# Patient Record
Sex: MALE | Race: WHITE | NOT HISPANIC OR LATINO
[De-identification: names, ages, dates, MRNs, and addresses within clinical notes are randomized per-mention and may not be internally consistent; named-entity substitution may affect disease eponyms.]

---

## 2021-05-26 PROBLEM — Z00.00 ENCOUNTER FOR PREVENTIVE HEALTH EXAMINATION: Status: ACTIVE | Noted: 2021-05-26

## 2021-05-27 ENCOUNTER — NON-APPOINTMENT (OUTPATIENT)
Age: 61
End: 2021-05-27

## 2021-05-27 ENCOUNTER — APPOINTMENT (OUTPATIENT)
Dept: ORTHOPEDIC SURGERY | Facility: CLINIC | Age: 61
End: 2021-05-27
Payer: COMMERCIAL

## 2021-05-27 VITALS — HEIGHT: 68 IN | WEIGHT: 200 LBS | BODY MASS INDEX: 30.31 KG/M2

## 2021-05-27 VITALS — HEART RATE: 67 BPM | SYSTOLIC BLOOD PRESSURE: 163 MMHG | DIASTOLIC BLOOD PRESSURE: 80 MMHG

## 2021-05-27 PROCEDURE — 99072 ADDL SUPL MATRL&STAF TM PHE: CPT

## 2021-05-27 PROCEDURE — 99204 OFFICE O/P NEW MOD 45 MIN: CPT

## 2021-05-27 PROCEDURE — 73502 X-RAY EXAM HIP UNI 2-3 VIEWS: CPT | Mod: LT

## 2021-05-27 PROCEDURE — 72100 X-RAY EXAM L-S SPINE 2/3 VWS: CPT

## 2021-05-27 NOTE — CONSULT LETTER
[Dear  ___] : Dear  [unfilled], [Consult Letter:] : I had the pleasure of evaluating your patient, [unfilled]. [Please see my note below.] : Please see my note below. [Consult Closing:] : Thank you very much for allowing me to participate in the care of this patient.  If you have any questions, please do not hesitate to contact me. [Sincerely,] : Sincerely, [FreeTextEntry2] : PADMINI ONEAL  [FreeTextEntry3] : Liang Tolbert M.D.

## 2021-05-27 NOTE — ADDENDUM
[FreeTextEntry1] : I, Patrice White, acted solely as a scribe for Dr. Liang Tolbert on this date 05/27/2021.\par All medical record entries made by the Scribe were at my, Dr. Liang Tolbert, direction and personally dictated by me on 05/27/2021. I have reviewed the chart and agree that the record accurately reflects my personal performance of the history, physical exam, assessment and plan. I have also personally directed, reviewed, and agreed with the chart.

## 2021-05-27 NOTE — DISCUSSION/SUMMARY
[de-identified] : I discussed the underlying pathophysiology of the patient's condition in great detail with the patient. I went over the patient's x-rays with them in great detail. The extent of the patient’s arthritis was discussed in great detail with them. We discussed the use of ice, Tylenol and anti-inflammatories to relieve pain. A prescription for Diclofenac was provided. At-home strengthening, and stretching exercises were discussed and demonstrated with the patient. We went over the wide ranging benefits of exercise for the patient health and I encouraged him to begin a diligent exercise program. He should focus on light weight and high repetition exercises. He needs to avoid high-impact activities such as running and jumping. I recommend alternate activities such as riding a stationary bike on low tension, or the elliptical. He should avoid exercises that involve arching and twisting. I stressed the importance of continued weight loss and its benefits to the patient’s joints and overall health. At this time, he will start a course of physical therapy for strengthening and flexibility. A prescription for physical therapy was provided. All of his questions were answered. He understands and consents to the plan.\par \par FU 1 month\par after undergoing PT for his low back and hips.\par after taking Diclofenac.\par after focusing on continued weight loss.

## 2021-05-27 NOTE — PHYSICAL EXAM
[de-identified] : Constitutional\par o Appearance : well-nourished, well developed, alert, in no acute distress \par Head and Face\par o Head :\par ¦ Inspection : atraumatic, normocephalic\par o Face :\par ¦ Inspection : no visible rash or discoloration\par Respiratory\par o Respiratory Effort: breathing unlabored \par Neurologic\par o Mental Status Examination :\par ¦ Orientation : alert and oriented X 3\par Psychiatric\par o Mood and Affect: mood normal, affect appropriate\par Cardiovascular\par o Observation/Palpation : - no swelling\par Lymphatic\par o Additional Nodes : No palpable lymph nodes present\par \par Lumbosacral Spine\par o Inspection : no visible rash or discoloration\par o Palpation : paraspinal musculature nontender\par o Range of Motion : extension and sidebending causes minimal anterior left thigh pain, rotation causes anterior thigh pain\par o Muscle Strength : paraspinal muscle strength and tone within normal limits\par o Muscle Tone : paraspinal muscle strength and tone within normal limits\par o Tests: straight leg test negative\par  \par Right Lower Extremity\par o Buttock : no tenderness, swelling or deformities\par o Right Hip :\par ¦ Inspection/Palpation : no tenderness, swelling or deformities\par ¦ Range of Motion : full flexion and external rotation, limited internal rotation with discomfort, no crepitance\par ¦ Stability : joint stability intact\par ¦ Strength : extension, flexion, adduction, abduction, internal rotation and external rotation, 4-/5 \par \par Left Lower Extremity\par o Buttock : no tenderness, swelling or deformities \par o Left Hip :\par ¦ Inspection/Palpation : no tenderness, swelling or deformities\par ¦ Range of Motion : full flexion and limited rotation with pain, no crepitance\par ¦ Stability : joint stability intact\par ¦ Strength : extension, flexion, adduction, abduction, internal rotation and external rotation, 4-/5\par  \par Gait: short stride, slightly kyphotic, a slight abductor lurch to the left, no significant extremity swelling or lymphedema, equal leg lengths, no foot drop, excellent core strength, normal sensation to light touch in both lower extremities\par \par Radiology Results \par o Lumbosacral Spine : AP and lateral views were obtained and reveal diffuse degenerative disc disease worse at L3/L4 with foraminal stenosis at L4/L5 and L5/S1.\par o Pelvis: AP pelvis and lateral left hip were obtained and reveal severe degenerative arthritis of both hips.

## 2021-05-27 NOTE — HISTORY OF PRESENT ILLNESS
[de-identified] : 61 year old male presents complaining of bilateral groin and thigh pain, left side worse than right. His pain started mildly 1 year ago and worsened about 5 months ago. He denies injury. He notes that walking and standing for a long period of time can be most uncomfortable. He is usually able to walk about 1/2 a mile before he has to rest. He also complains of pain when sitting in certain chairs for too long. He has some pain when sleeping at night. He has mild intermittent low back pain, as well. He does not experience pain that radiates past his knees. He notes difficulty lifting his leg to put on shoes and socks. He is a  and sits a lot at work. He is fully vaccinated and recently started going to the gym regularly. He notes that his weight has been stable. His BMI is 30.4 (weight= 200 pounds, height = 5 foot 8 inches).

## 2021-05-29 ENCOUNTER — TRANSCRIPTION ENCOUNTER (OUTPATIENT)
Age: 61
End: 2021-05-29

## 2021-07-08 ENCOUNTER — APPOINTMENT (OUTPATIENT)
Dept: ORTHOPEDIC SURGERY | Facility: CLINIC | Age: 61
End: 2021-07-08
Payer: COMMERCIAL

## 2021-07-08 PROCEDURE — 99072 ADDL SUPL MATRL&STAF TM PHE: CPT

## 2021-07-08 PROCEDURE — 99214 OFFICE O/P EST MOD 30 MIN: CPT

## 2021-07-08 NOTE — ADDENDUM
[FreeTextEntry1] : I, Patrice White, acted solely as a scribe for Dr. Liang Tolbert on this date 07/08/2021.\par All medical record entries made by the Scribe were at my, Dr. Liang Tolbert, direction and personally dictated by me on 07/08/2021. I have reviewed the chart and agree that the record accurately reflects my personal performance of the history, physical exam, assessment and plan. I have also personally directed, reviewed, and agreed with the chart.

## 2021-07-08 NOTE — PHYSICAL EXAM
[de-identified] : Constitutional\par o Appearance : well-nourished, well developed, alert, in no acute distress \par Head and Face\par o Head :\par ¦ Inspection : atraumatic, normocephalic\par o Face :\par ¦ Inspection : no visible rash or discoloration\par Respiratory\par o Respiratory Effort: breathing unlabored \par Neurologic\par o Mental Status Examination :\par ¦ Orientation : alert and oriented X 3\par Psychiatric\par o Mood and Affect: mood normal, affect appropriate\par Cardiovascular\par o Observation/Palpation : - no swelling\par Lymphatic\par o Additional Nodes : No palpable lymph nodes present\par \par Lumbosacral Spine\par o Inspection : no visible rash or discoloration\par o Palpation : paraspinal musculature nontender\par o Range of Motion : arc of motion full and painless in all planes\par o Muscle Strength : paraspinal muscle strength and tone within normal limits\par o Muscle Tone : paraspinal muscle strength and tone within normal limits\par o Tests: straight leg test negative and GHISLAINE test negative bilaterally\par  \par Right Lower Extremity\par o Buttock : no tenderness, swelling or deformities\par o Right Hip :\par ¦ Inspection/Palpation : no tenderness, swelling or deformities\par ¦ Range of Motion : full flexion, slight limitation of rotation with minimal discomfort, no crepitance\par ¦ Stability : joint stability intact\par ¦ Strength : extension, flexion, adduction, abduction, internal rotation and external rotation, 5/5 \par \par Left Lower Extremity\par o Buttock : no tenderness, swelling or deformities \par o Left Hip :\par ¦ Inspection/Palpation : no tenderness, swelling or deformities\par ¦ Range of Motion : full flexion, slight limitation of rotation with minimal discomfort, no crepitance\par ¦ Stability : joint stability intact\par ¦ Strength : extension, flexion, adduction, abduction, internal rotation and external rotation, 5/5\par  \par Gait: normal smooth gait, heel and toe walking without discomfort, no significant extremity swelling or lymphedema, right leg is 1/4 inch longer than the left, no foot drop, excellent core strength, normal sensation to light touch in both lower extremities

## 2021-07-08 NOTE — HISTORY OF PRESENT ILLNESS
[de-identified] : 61 year old male presents complaining of bilateral groin and thigh pain, left side worse than right. He has mild intermittent low back pain, as well. He has been going to PT and is feeling much better. He is also taking Diclofenac every morning. He notes he is now able to walk distances without a problem. He notes mild trouble putting on socks and shoes due to stiffness, but not pain. He denies any buckling or giving out. Of note, He is fully vaccinated against COVID-19 and has started going to the gym regularly. He notes his weight has been stable. His BMI is 30.4 (weight= 200 pounds, height = 5 foot 8 inches).\par \par Radiology Results taken on 5/27/21:\par o Lumbosacral Spine : AP and lateral views were obtained and revealed diffuse degenerative disc disease worse at L3/L4 with foraminal stenosis at L4/L5 and L5/S1.\par o Pelvis: AP pelvis and lateral left hip were obtained and revealed severe degenerative arthritis of both hips.

## 2021-07-08 NOTE — DISCUSSION/SUMMARY
[de-identified] : I went over the pathophysiology of the patient's symptoms in great detail with the patient. We discussed the use of ice, Tylenol and anti-inflammatories to relieve pain. I advised him to try to wean himself off of the Diclofenac to 2-3 times a week. At-home strengthening, and stretching exercises were discussed and demonstrated with the patient. We went over the wide ranging benefits of exercise for the patient's health and I encouraged him to begin a diligent exercise program. I recommended activities such as bike riding, swimming, and light weight lifting. He should focus on light weight and high repetition exercises. All of his questions were answered. He understands and consents to the plan.\par \par FU 3 months.\par after following a diligent HEP.

## 2021-09-23 ENCOUNTER — RX RENEWAL (OUTPATIENT)
Age: 61
End: 2021-09-23

## 2021-10-14 ENCOUNTER — APPOINTMENT (OUTPATIENT)
Dept: ORTHOPEDIC SURGERY | Facility: CLINIC | Age: 61
End: 2021-10-14
Payer: COMMERCIAL

## 2021-10-14 PROCEDURE — 99214 OFFICE O/P EST MOD 30 MIN: CPT

## 2021-10-14 NOTE — DISCUSSION/SUMMARY
[de-identified] : I went over the pathophysiology of the patient's symptoms in great detail with the patient. We discussed the use of ice, Tylenol and anti-inflammatories to relieve pain. I advised him not to mix the Diclofenac and Aleve. Instead of Aleve he should try taking Tylenol. He will continue exercising on his own. I advised him to alternate using an elliptical and a stationary bike at the gym. All of his questions were answered. He understands and consents to the plan.\par \par FU 3 months.

## 2021-10-14 NOTE — ADDENDUM
[FreeTextEntry1] : I, Patrice White, acted solely as a scribe for Dr. Liang Tolbert on this date 10/14/2021.\par All medical record entries made by the Scribe were at my, Dr. Liang Tolbert, direction and personally dictated by me on 10/14/2021. I have reviewed the chart and agree that the record accurately reflects my personal performance of the history, physical exam, assessment and plan. I have also personally directed, reviewed, and agreed with the chart.

## 2021-10-14 NOTE — HISTORY OF PRESENT ILLNESS
[de-identified] : 61 year old male presents complaining of bilateral groin and thigh pain, left side worse than right. He denies low back pain. He finished a course of PT from June to July and was doing better. He has been exercising at the gym 3-4 times a week. He does an elliptical not the treadmill. He notes that he feels great after he works out but the next day he has discomfort. He notes that his pain is a little worse. It is a 2/10 on average but can be a 5-6/10. He takes Diclofenac in the morning and Aleve at night. Of note, He is fully vaccinated against COVID-19. He notes his weight has been stable. His BMI is 30.4 (weight= 200 pounds, height = 5 foot 8 inches).\par \par Radiology Results taken on 5/27/21:\par o Lumbosacral Spine : AP and lateral views were obtained and revealed diffuse degenerative disc disease worse at L3/L4 with foraminal stenosis at L4/L5 and L5/S1.\par o Pelvis: AP pelvis and lateral left hip were obtained and revealed severe degenerative arthritis of both hips.

## 2022-01-19 ENCOUNTER — RX RENEWAL (OUTPATIENT)
Age: 62
End: 2022-01-19

## 2022-01-19 RX ORDER — DICLOFENAC SODIUM 75 MG/1
75 TABLET, DELAYED RELEASE ORAL
Qty: 60 | Refills: 3 | Status: ACTIVE | COMMUNITY
Start: 2021-05-27 | End: 1900-01-01

## 2022-01-20 ENCOUNTER — APPOINTMENT (OUTPATIENT)
Dept: ORTHOPEDIC SURGERY | Facility: CLINIC | Age: 62
End: 2022-01-20
Payer: COMMERCIAL

## 2022-01-20 DIAGNOSIS — M47.816 SPONDYLOSIS W/OUT MYELOPATHY OR RADICULOPATHY, LUMBAR REGION: ICD-10-CM

## 2022-01-20 PROCEDURE — 99214 OFFICE O/P EST MOD 30 MIN: CPT

## 2022-01-20 NOTE — ADDENDUM
[FreeTextEntry1] : I, Patrice White, acted solely as a scribe for Dr. Liang Tolbert on this date 01/20/2022.\par All medical record entries made by the Scribe were at my, Dr. Liang Tolbert, direction and personally dictated by me on 01/20/2022. I have reviewed the chart and agree that the record accurately reflects my personal performance of the history, physical exam, assessment and plan. I have also personally directed, reviewed, and agreed with the chart.

## 2022-01-20 NOTE — PHYSICAL EXAM
[de-identified] : Constitutional\par o Appearance : well-nourished, well developed, alert, in no acute distress \par Head and Face\par o Head :\par ¦ Inspection : atraumatic, normocephalic\par o Face :\par ¦ Inspection : no visible rash or discoloration\par Respiratory\par o Respiratory Effort: breathing unlabored \par Neurologic\par o Mental Status Examination :\par ¦ Orientation : alert and oriented X 3\par Psychiatric\par o Mood and Affect: mood normal, affect appropriate\par Cardiovascular\par o Observation/Palpation : - no swelling\par Lymphatic\par o Additional Nodes : No palpable lymph nodes present\par \par Lumbosacral Spine\par o Inspection : no visible rash or discoloration\par o Palpation : paraspinal musculature nontender\par o Range of Motion : arc of motion full and painless in all planes\par o Muscle Strength : paraspinal muscle strength and tone within normal limits\par o Muscle Tone : paraspinal muscle strength and tone within normal limits\par o Tests: straight leg test negative and GHISLAINE test negative bilaterally\par  \par Right Lower Extremity\par o Buttock : no tenderness, swelling or deformities\par o Right Hip :\par ¦ Inspection/Palpation : slight greater trochanteric tenderness, no swelling or deformities\par ¦ Range of Motion : full flexion, limited rotation with discomfort, mild pain with rotation, no crepitance\par ¦ Stability : joint stability intact\par ¦ Strength : extension, flexion 3+/5, adduction, abduction 4/5, internal rotation and external rotation\par \par Left Lower Extremity\par o Buttock : no tenderness, swelling or deformities \par o Left Hip :\par ¦ Inspection/Palpation : mild greater trochanteric tenderness, no swelling or deformities\par ¦ Range of Motion : full flexion, limited rotation, no crepitance\par ¦ Stability : joint stability intact\par ¦ Strength : extension, flexion 4-/5, adduction, abduction 4+/5, internal rotation and external rotation\par  \par Gait: slight abductor lurch on the right, no significant extremity swelling or lymphedema, equal leg lengths, no foot drop, excellent core strength, normal sensation to light touch

## 2022-01-20 NOTE — DISCUSSION/SUMMARY
[de-identified] : I went over the pathophysiology of the patient's symptoms in great detail with the patient. At this time, he will start a course of physical therapy for strengthening and flexibility.  We have preliminarily discussed total hip replacement.  We will try this course of physical therapy to evaluate where the strength will decrease his symptoms.  He will try to use diclofenac pills on a more as necessary basis instead of around-the-clock.  A prescription was provided. All of his questions were answered. He understands and consents to the plan.\par \par FU 1 month.\par after undergoing PT for his hips.

## 2022-01-20 NOTE — HISTORY OF PRESENT ILLNESS
[de-identified] : 61 year old male presents complaining of bilateral lateral hip pain, left worse than right. His back is not bothering him. He finished a course of PT from June-July and was doing better. He is going to the gym regularly. He was last seen on 10/14/21 and notes his hips are doing worse since then. He notes exacerbated right hip pain over the holidays after he did a lot of standing. He worked from home and his pain improved. He then [place] work in the city, which causes pain by the end of the day. He denies groin pain. He notes trouble tying the left shoe. He notes a 0-5/10 pain on the right side, and a 4-6/10 pain on the left side. He is still taking Diclofenac daily. \par Of note, He is fully vaccinated against COVID-19. He notes his weight has been stable. His BMI is 30.4 (weight= 200 pounds, height = 5 foot 8 inches).\par \par Radiology Results taken on 5/27/21:\par o Lumbosacral Spine : AP and lateral views were obtained and revealed diffuse degenerative disc disease worse at L3/L4 with foraminal stenosis at L4/L5 and L5/S1.\par o Pelvis: AP pelvis and lateral left hip were obtained and revealed severe degenerative arthritis of both hips.

## 2022-03-10 ENCOUNTER — APPOINTMENT (OUTPATIENT)
Dept: ORTHOPEDIC SURGERY | Facility: CLINIC | Age: 62
End: 2022-03-10
Payer: COMMERCIAL

## 2022-03-10 VITALS — BODY MASS INDEX: 29.86 KG/M2 | WEIGHT: 197 LBS | HEIGHT: 68 IN

## 2022-03-10 DIAGNOSIS — M48.062 SPINAL STENOSIS, LUMBAR REGION WITH NEUROGENIC CLAUDICATION: ICD-10-CM

## 2022-03-10 PROCEDURE — 99214 OFFICE O/P EST MOD 30 MIN: CPT

## 2022-03-10 NOTE — ADDENDUM
[FreeTextEntry1] : I, Patrice White, acted solely as a scribe for Dr. Liang Tolbert on this date 03/10/2022.\par All medical record entries made by the Scribe were at my, Dr. Liang Tolbert, direction and personally dictated by me on 03/10/2022. I have reviewed the chart and agree that the record accurately reflects my personal performance of the history, physical exam, assessment and plan. I have also personally directed, reviewed, and agreed with the chart.

## 2022-03-10 NOTE — PHYSICAL EXAM
[de-identified] : Constitutional\par o Appearance : well-nourished, well developed, alert, in no acute distress \par Head and Face\par o Head :\par ¦ Inspection : atraumatic, normocephalic\par o Face :\par ¦ Inspection : no visible rash or discoloration\par Respiratory\par o Respiratory Effort: breathing unlabored \par Neurologic\par o Mental Status Examination :\par ¦ Orientation : alert and oriented X 3\par Psychiatric\par o Mood and Affect: mood normal, affect appropriate\par Cardiovascular\par o Observation/Palpation : - no swelling\par Lymphatic\par o Additional Nodes : No palpable lymph nodes present\par \par Lumbosacral Spine\par o Inspection : no visible rash or discoloration\par o Palpation : paraspinal musculature nontender\par o Range of Motion : arc of motion full and painless in all planes\par o Muscle Strength : paraspinal muscle strength and tone within normal limits\par o Muscle Tone : paraspinal muscle strength and tone within normal limits\par o Tests: straight leg test negative and GHISLAINE test negative bilaterally\par  \par Right Lower Extremity\par o Buttock : no tenderness, swelling or deformities\par o Right Hip :\par ¦ Inspection/Palpation : slight greater trochanteric tenderness, no swelling or deformities\par ¦ Range of Motion : full flexion, limited internal rotation with discomfort, no crepitance\par ¦ Stability : joint stability intact\par ¦ Strength : extension, flexion 5/5, adduction, abduction, internal rotation and external rotation\par \par Left Lower Extremity\par o Buttock : no tenderness, swelling or deformities \par o Left Hip :\par ¦ Inspection/Palpation : mild greater trochanteric tenderness, no swelling or deformities\par ¦ Range of Motion : full flexion but goes into obligatory external rotation, essentially no internal rotation, no crepitance\par ¦ Stability : joint stability intact\par ¦ Strength : extension, flexion 5/5, adduction, abduction, internal rotation and external rotation\par  \par Gait: slight abductor lurch on the right, no significant extremity swelling or lymphedema, equal leg lengths, no foot drop, excellent core strength, normal sensation to light touch

## 2022-03-10 NOTE — DISCUSSION/SUMMARY
[de-identified] : I went over the pathophysiology of the patient's symptoms in great detail with the patient. I informed the patient that surgery will be required to provide them long term relief from their symptoms. I am recommending a left total hip replacement and advised him this is the best his hips will get and at 62 years old it is not too early for surgery. Patient understands he needs to consider hip replacement given he is losing motion and conservative measures are not providing enough relief. I informed him that I no longer perform hip replacements. He was provided with the names of my partners Dr. Theo Kwan and Dr. Timoteo Laboy and I recommend he sees one of them for a consultation. All of his questions were answered. He understands and consents to the plan.\par \par FU with Dr. Kwan or Dr. Laboy to be evaluated for a left total hip replacement

## 2022-03-10 NOTE — HISTORY OF PRESENT ILLNESS
[de-identified] : 62 year old male presents complaining of bilateral lateral hip pain. The left is more consistent but less severe. The right is more severe when it flares up. He recently finished 10 PT sessions. He feels a little stronger and can now go up and down stairs. He doesn't feel PT helped much with his pain. He describes left anterior hip pain, and right groin and lateral hip pain. He feels he has lost some ROM. He notes trouble tying the left shoe. \par Of note: He is fully vaccinated against COVID-19. He notes he lost 3 pounds. His BMI is 30.4 (weight= 200 pounds, height = 5 foot 8 inches).\par \par Radiology Results taken on 5/27/21:\par o Lumbosacral Spine : AP and lateral views were obtained and revealed diffuse degenerative disc disease worse at L3/L4 with foraminal stenosis at L4/L5 and L5/S1.\par o Pelvis: AP pelvis and lateral left hip were obtained and revealed severe degenerative arthritis of both hips.

## 2022-03-16 ENCOUNTER — APPOINTMENT (OUTPATIENT)
Dept: ORTHOPEDIC SURGERY | Facility: CLINIC | Age: 62
End: 2022-03-16
Payer: COMMERCIAL

## 2022-03-16 VITALS — BODY MASS INDEX: 29.86 KG/M2 | HEIGHT: 68 IN | WEIGHT: 197 LBS

## 2022-03-16 DIAGNOSIS — M16.11 UNILATERAL PRIMARY OSTEOARTHRITIS, RIGHT HIP: ICD-10-CM

## 2022-03-16 PROCEDURE — 99214 OFFICE O/P EST MOD 30 MIN: CPT

## 2022-03-16 NOTE — DISCUSSION/SUMMARY
[de-identified] : Impression end-stage osteoarthritis right greater than left hip\par Recommendation given compromised quality life unresponsive to medical management to date I have recommended total hip replacement and reviewed risk benefits and alternatives to anterior total hip have also recommended radiation for prophylaxis of heterotopic ossification

## 2022-03-16 NOTE — HISTORY OF PRESENT ILLNESS
[de-identified] : 62-year-old male  who 1 year ago developed spontaneousonset of bilateral hip pain.  Pain left hip is tonic character constant over the anterior aspect of the thigh and into the groin.  Right hip is episodic over the lateral aspect occasion the groin.  Symptoms are not improved in response to physical therapy.  Left until recently symptoms were responsive to diclofenac.  Overall patient feels he has "more bad days than good days.  Feels that quality life is compromised

## 2022-03-16 NOTE — PHYSICAL EXAM
[de-identified] : Constitutional:Well nourished , well developed and in no acute distress\par Psychiatric: Alert and oriented to time place and person.Appropriate affect \par Skin:Head, neck, arms and lower extremities:no lesions or discoloration\par HEENT: Normocephalic, EOM intact, Nasal septum midline,\par Respiratory: Unlabored respirations,no audible wheezing ,no tachypnea, no cyanosis\par Cardiovascular: no leg swelling  no ankle edema no JVD, pulse regular\par Vascular: no calf or thigh tenderness, \par Peripheral pulses; intact\par Lymphatics:No groin adenopathy,no lymphedema lower  or upper extremities\par Right and left Leg lengths equal passive motion restricted and painful left flexion 80 internal 0 external 30 abduction 30 adduction 20\par Right flexion 80 internal -5 external 30 abduction 30 adduction 15 [de-identified] : Recent x-rays AP pelvis right and left hip are reviewed and demonstrate bilateral hip degenerative arthritis joint space narrowing superolateral bone-on-bone contact subchondral sclerosis

## 2022-04-06 ENCOUNTER — OUTPATIENT (OUTPATIENT)
Dept: OUTPATIENT SERVICES | Facility: HOSPITAL | Age: 62
LOS: 1 days | End: 2022-04-06
Payer: COMMERCIAL

## 2022-04-06 VITALS
DIASTOLIC BLOOD PRESSURE: 75 MMHG | RESPIRATION RATE: 14 BRPM | TEMPERATURE: 97 F | SYSTOLIC BLOOD PRESSURE: 157 MMHG | HEART RATE: 68 BPM | HEIGHT: 67 IN | WEIGHT: 199.96 LBS | OXYGEN SATURATION: 98 %

## 2022-04-06 DIAGNOSIS — M16.11 UNILATERAL PRIMARY OSTEOARTHRITIS, RIGHT HIP: ICD-10-CM

## 2022-04-06 DIAGNOSIS — Z01.818 ENCOUNTER FOR OTHER PREPROCEDURAL EXAMINATION: ICD-10-CM

## 2022-04-06 DIAGNOSIS — K08.409 PARTIAL LOSS OF TEETH, UNSPECIFIED CAUSE, UNSPECIFIED CLASS: Chronic | ICD-10-CM

## 2022-04-06 LAB
A1C WITH ESTIMATED AVERAGE GLUCOSE RESULT: 5.9 % — HIGH (ref 4–5.6)
ALBUMIN SERPL ELPH-MCNC: 4.2 G/DL — SIGNIFICANT CHANGE UP (ref 3.3–5)
ALP SERPL-CCNC: 58 U/L — SIGNIFICANT CHANGE UP (ref 30–120)
ALT FLD-CCNC: 65 U/L DA — HIGH (ref 10–60)
ANION GAP SERPL CALC-SCNC: 2 MMOL/L — LOW (ref 5–17)
APTT BLD: 31.7 SEC — SIGNIFICANT CHANGE UP (ref 27.5–35.5)
AST SERPL-CCNC: 26 U/L — SIGNIFICANT CHANGE UP (ref 10–40)
BILIRUB SERPL-MCNC: 0.7 MG/DL — SIGNIFICANT CHANGE UP (ref 0.2–1.2)
BLD GP AB SCN SERPL QL: SIGNIFICANT CHANGE UP
BUN SERPL-MCNC: 21 MG/DL — SIGNIFICANT CHANGE UP (ref 7–23)
CALCIUM SERPL-MCNC: 8.9 MG/DL — SIGNIFICANT CHANGE UP (ref 8.4–10.5)
CHLORIDE SERPL-SCNC: 102 MMOL/L — SIGNIFICANT CHANGE UP (ref 96–108)
CO2 SERPL-SCNC: 31 MMOL/L — SIGNIFICANT CHANGE UP (ref 22–31)
CREAT SERPL-MCNC: 0.85 MG/DL — SIGNIFICANT CHANGE UP (ref 0.5–1.3)
EGFR: 97 ML/MIN/1.73M2 — SIGNIFICANT CHANGE UP
ESTIMATED AVERAGE GLUCOSE: 123 MG/DL — HIGH (ref 68–114)
GLUCOSE SERPL-MCNC: 108 MG/DL — HIGH (ref 70–99)
HCT VFR BLD CALC: 47.2 % — SIGNIFICANT CHANGE UP (ref 39–50)
HGB BLD-MCNC: 15.2 G/DL — SIGNIFICANT CHANGE UP (ref 13–17)
INR BLD: 0.97 RATIO — SIGNIFICANT CHANGE UP (ref 0.88–1.16)
MCHC RBC-ENTMCNC: 28.1 PG — SIGNIFICANT CHANGE UP (ref 27–34)
MCHC RBC-ENTMCNC: 32.2 GM/DL — SIGNIFICANT CHANGE UP (ref 32–36)
MCV RBC AUTO: 87.2 FL — SIGNIFICANT CHANGE UP (ref 80–100)
MRSA PCR RESULT.: SIGNIFICANT CHANGE UP
NRBC # BLD: 0 /100 WBCS — SIGNIFICANT CHANGE UP (ref 0–0)
PLATELET # BLD AUTO: 169 K/UL — SIGNIFICANT CHANGE UP (ref 150–400)
POTASSIUM SERPL-MCNC: 5.4 MMOL/L — HIGH (ref 3.5–5.3)
POTASSIUM SERPL-SCNC: 5.4 MMOL/L — HIGH (ref 3.5–5.3)
PROT SERPL-MCNC: 7.9 G/DL — SIGNIFICANT CHANGE UP (ref 6–8.3)
PROTHROM AB SERPL-ACNC: 11.5 SEC — SIGNIFICANT CHANGE UP (ref 10.5–13.4)
RBC # BLD: 5.41 M/UL — SIGNIFICANT CHANGE UP (ref 4.2–5.8)
RBC # FLD: 12.7 % — SIGNIFICANT CHANGE UP (ref 10.3–14.5)
S AUREUS DNA NOSE QL NAA+PROBE: DETECTED
SODIUM SERPL-SCNC: 135 MMOL/L — SIGNIFICANT CHANGE UP (ref 135–145)
WBC # BLD: 8.76 K/UL — SIGNIFICANT CHANGE UP (ref 3.8–10.5)
WBC # FLD AUTO: 8.76 K/UL — SIGNIFICANT CHANGE UP (ref 3.8–10.5)

## 2022-04-06 PROCEDURE — 87640 STAPH A DNA AMP PROBE: CPT

## 2022-04-06 PROCEDURE — 87641 MR-STAPH DNA AMP PROBE: CPT

## 2022-04-06 PROCEDURE — 93010 ELECTROCARDIOGRAM REPORT: CPT

## 2022-04-06 PROCEDURE — 80053 COMPREHEN METABOLIC PANEL: CPT

## 2022-04-06 PROCEDURE — 93005 ELECTROCARDIOGRAM TRACING: CPT

## 2022-04-06 PROCEDURE — 85027 COMPLETE CBC AUTOMATED: CPT

## 2022-04-06 PROCEDURE — 36415 COLL VENOUS BLD VENIPUNCTURE: CPT

## 2022-04-06 PROCEDURE — 85730 THROMBOPLASTIN TIME PARTIAL: CPT

## 2022-04-06 PROCEDURE — 83036 HEMOGLOBIN GLYCOSYLATED A1C: CPT

## 2022-04-06 PROCEDURE — 85610 PROTHROMBIN TIME: CPT

## 2022-04-06 PROCEDURE — 86900 BLOOD TYPING SEROLOGIC ABO: CPT

## 2022-04-06 PROCEDURE — 86901 BLOOD TYPING SEROLOGIC RH(D): CPT

## 2022-04-06 PROCEDURE — G0463: CPT

## 2022-04-06 PROCEDURE — 86850 RBC ANTIBODY SCREEN: CPT

## 2022-04-06 NOTE — H&P PST ADULT - MUSCULOSKELETAL
detailed exam bilateral hips/no joint swelling/no joint erythema/no joint warmth/no calf tenderness/decreased ROM/decreased ROM due to pain/diminished strength details…

## 2022-04-06 NOTE — H&P PST ADULT - NSICDXPASTMEDICALHX_GEN_ALL_CORE_FT
PAST MEDICAL HISTORY:  Borderline hypertension     COVID-19 vaccine series completed     Dry eye of left side     Ear stuffiness, left     Hyperlipidemia     Lumbar spinal stenosis     Osteoarthritis

## 2022-04-06 NOTE — H&P PST ADULT - FALL HARM RISK - UNIVERSAL INTERVENTIONS
Bed in lowest position, wheels locked, appropriate side rails in place/Call bell, personal items and telephone in reach/Instruct patient to call for assistance before getting out of bed or chair/[employer] to call system/Physically safe environment - no spills, clutter or unnecessary equipment/Purposeful Proactive Rounding/Room/bathroom lighting operational, light cord in reach

## 2022-04-06 NOTE — H&P PST ADULT - HISTORY OF PRESENT ILLNESS
61 yo male presents with c/o bilateral hip and groin pain. Left hip has been painful for a longer period of time but the right hip is more painful. Pain in the right hip and groin started in October 2021. He was treated initially with PT with improved strength but no change in pain. Pain currently 3/10 at rest and increased to 6-81/0 with activity. Pain is mildly relieved with diclofenac and tylenol.

## 2022-04-06 NOTE — H&P PST ADULT - NSANTHOSAYNRD_GEN_A_CORE
No. NICK screening performed.  STOP BANG Legend: 0-2 = LOW Risk; 3-4 = INTERMEDIATE Risk; 5-8 = HIGH Risk neck 16.5 inches/No. NICK screening performed.  STOP BANG Legend: 0-2 = LOW Risk; 3-4 = INTERMEDIATE Risk; 5-8 = HIGH Risk

## 2022-04-06 NOTE — H&P PST ADULT - PROBLEM SELECTOR PLAN 1
right anterior THR. medical clearance requested. instructed to stop vitamin E, diclofenac, arthrosoothe, omega-avail and amino acid synergy 1 week prior to surgery. surgical wash instructions reviewed and verbalized understanding. [employer] PCR appt. confirmed for 4/24/22.

## 2022-04-06 NOTE — H&P PST ADULT - NSICDXFAMILYHX_GEN_ALL_CORE_FT
FAMILY HISTORY:  Father  Still living? No  Family history of DVT, Age at diagnosis: Age Unknown  Family history of kidney disease, Age at diagnosis: Age Unknown  Family history of stomach cancer, Age at diagnosis: Age Unknown    Mother  Still living? No  Family history of osteoarthritis, Age at diagnosis: Age Unknown  FHx: dementia, Age at diagnosis: Age Unknown    Sibling  Still living? Yes, Estimated age: 71-80  Family history of hyperlipidemia, Age at diagnosis: Age Unknown  Family history of osteoarthritis, Age at diagnosis: Age Unknown    Grandparent  Still living? No  Family history of type 2 diabetes mellitus, Age at diagnosis: Age Unknown  Family history of type 2 diabetes mellitus, Age at diagnosis: Age Unknown

## 2022-04-07 RX ORDER — MUPIROCIN 20 MG/G
1 OINTMENT TOPICAL
Qty: 1 | Refills: 0
Start: 2022-04-07 | End: 2022-04-11

## 2022-04-07 NOTE — PROGRESS NOTE ADULT - SUBJECTIVE AND OBJECTIVE BOX
Nasal PCR results: staph aureus detected  Topical mupirocin escribed  Spoke to patient; understands treatment as prescribed

## 2022-04-13 PROBLEM — M48.061 SPINAL STENOSIS, LUMBAR REGION WITHOUT NEUROGENIC CLAUDICATION: Chronic | Status: ACTIVE | Noted: 2022-04-06

## 2022-04-13 PROBLEM — H93.8X2 OTHER SPECIFIED DISORDERS OF LEFT EAR: Chronic | Status: ACTIVE | Noted: 2022-04-06

## 2022-04-13 PROBLEM — Z92.29 PERSONAL HISTORY OF OTHER DRUG THERAPY: Chronic | Status: ACTIVE | Noted: 2022-04-06

## 2022-04-13 PROBLEM — R03.0 ELEVATED BLOOD-PRESSURE READING, WITHOUT DIAGNOSIS OF HYPERTENSION: Chronic | Status: ACTIVE | Noted: 2022-04-06

## 2022-04-13 PROBLEM — E78.5 HYPERLIPIDEMIA, UNSPECIFIED: Chronic | Status: ACTIVE | Noted: 2022-04-06

## 2022-04-13 PROBLEM — M19.90 UNSPECIFIED OSTEOARTHRITIS, UNSPECIFIED SITE: Chronic | Status: ACTIVE | Noted: 2022-04-06

## 2022-04-13 PROBLEM — H04.122 DRY EYE SYNDROME OF LEFT LACRIMAL GLAND: Chronic | Status: ACTIVE | Noted: 2022-04-06

## 2022-04-20 ENCOUNTER — APPOINTMENT (OUTPATIENT)
Dept: ORTHOPEDIC SURGERY | Facility: CLINIC | Age: 62
End: 2022-04-20
Payer: COMMERCIAL

## 2022-04-20 VITALS — HEIGHT: 68 IN | BODY MASS INDEX: 29.55 KG/M2 | WEIGHT: 195 LBS

## 2022-04-20 PROCEDURE — 99213 OFFICE O/P EST LOW 20 MIN: CPT

## 2022-04-20 NOTE — DISCUSSION/SUMMARY
[de-identified] : Impression end-stage osteoarthritis right greater than left hip\par Recommendation given compromised quality life unresponsive to medical management to date I have recommended total hip replacement and reviewed risk benefits and alternatives to anterior total hip have also recommended radiation for prophylaxis of heterotopic ossification

## 2022-04-20 NOTE — HISTORY OF PRESENT ILLNESS
[de-identified] : Follow-upbilateral hip pain.  Pain left hip is tonic character constant over the anterior aspect of the thigh and into the groin.  Right hip is episodic over the lateral aspect occasion the groin.  Symptoms are not improved in response to physical therapy.  Left until recently symptoms were responsive to diclofenac.  Overall patient feels he has "more bad days than good days.  Feels that quality life is compromised

## 2022-04-20 NOTE — PHYSICAL EXAM
[de-identified] : Constitutional:Well nourished , well developed and in no acute distress\par Psychiatric: Alert and oriented to time place and person.Appropriate affect \par Skin:Head, neck, arms and lower extremities:no lesions or discoloration\par HEENT: Normocephalic, EOM intact, Nasal septum midline,\par Respiratory: Unlabored respirations,no audible wheezing ,no tachypnea, no cyanosis\par Cardiovascular: no leg swelling  no ankle edema no JVD, pulse regular\par Vascular: no calf or thigh tenderness, \par Peripheral pulses; intact\par Lymphatics:No groin adenopathy,no lymphedema lower  or upper extremities\par Right and left Leg lengths equal passive motion restricted and painful left flexion 80 internal 0 external 30 abduction 30 adduction 20\par Right flexion 80 internal -5 external 30 abduction 30 adduction 15 Skin intact [de-identified] : Review x-rays AP pelvis right and left hip are reviewed and demonstrate bilateral hip degenerative arthritis joint space narrowing superolateral bone-on-bone contact subchondral sclerosis

## 2022-04-21 ENCOUNTER — OUTPATIENT (OUTPATIENT)
Dept: OUTPATIENT SERVICES | Facility: HOSPITAL | Age: 62
LOS: 1 days | Discharge: HOME | End: 2022-04-21
Payer: COMMERCIAL

## 2022-04-21 ENCOUNTER — APPOINTMENT (OUTPATIENT)
Dept: RADIATION ONCOLOGY | Facility: CLINIC | Age: 62
End: 2022-04-21
Payer: COMMERCIAL

## 2022-04-21 DIAGNOSIS — K08.409 PARTIAL LOSS OF TEETH, UNSPECIFIED CAUSE, UNSPECIFIED CLASS: Chronic | ICD-10-CM

## 2022-04-21 DIAGNOSIS — M16.0 BILATERAL PRIMARY OSTEOARTHRITIS OF HIP: ICD-10-CM

## 2022-04-21 PROCEDURE — 99202 OFFICE O/P NEW SF 15 MIN: CPT | Mod: 25,95

## 2022-04-21 PROCEDURE — 77261 THER RADIOLOGY TX PLNG SMPL: CPT

## 2022-04-21 RX ORDER — ROSUVASTATIN CALCIUM 20 MG/1
20 TABLET, FILM COATED ORAL
Refills: 0 | Status: ACTIVE | COMMUNITY
Start: 2022-04-21

## 2022-04-24 ENCOUNTER — OUTPATIENT (OUTPATIENT)
Dept: OUTPATIENT SERVICES | Facility: HOSPITAL | Age: 62
LOS: 1 days | End: 2022-04-24
Payer: COMMERCIAL

## 2022-04-24 DIAGNOSIS — Z20.828 CONTACT WITH AND (SUSPECTED) EXPOSURE TO OTHER VIRAL COMMUNICABLE DISEASES: ICD-10-CM

## 2022-04-24 DIAGNOSIS — K08.409 PARTIAL LOSS OF TEETH, UNSPECIFIED CAUSE, UNSPECIFIED CLASS: Chronic | ICD-10-CM

## 2022-04-24 LAB — SARS-COV-2 RNA SPEC QL NAA+PROBE: SIGNIFICANT CHANGE UP

## 2022-04-24 PROCEDURE — U0003: CPT

## 2022-04-24 PROCEDURE — U0005: CPT

## 2022-04-24 RX ORDER — ACETAMINOPHEN 500 MG
1000 TABLET ORAL EVERY 8 HOURS
Refills: 0 | Status: DISCONTINUED | OUTPATIENT
Start: 2022-04-27 | End: 2022-04-27

## 2022-04-24 RX ORDER — PANTOPRAZOLE SODIUM 20 MG/1
40 TABLET, DELAYED RELEASE ORAL
Refills: 0 | Status: DISCONTINUED | OUTPATIENT
Start: 2022-04-26 | End: 2022-04-27

## 2022-04-24 RX ORDER — MAGNESIUM HYDROXIDE 400 MG/1
30 TABLET, CHEWABLE ORAL DAILY
Refills: 0 | Status: DISCONTINUED | OUTPATIENT
Start: 2022-04-26 | End: 2022-04-27

## 2022-04-24 RX ORDER — CELECOXIB 200 MG/1
200 CAPSULE ORAL EVERY 12 HOURS
Refills: 0 | Status: DISCONTINUED | OUTPATIENT
Start: 2022-04-27 | End: 2022-04-27

## 2022-04-24 RX ORDER — HYDROMORPHONE HYDROCHLORIDE 2 MG/ML
0.5 INJECTION INTRAMUSCULAR; INTRAVENOUS; SUBCUTANEOUS
Refills: 0 | Status: DISCONTINUED | OUTPATIENT
Start: 2022-04-26 | End: 2022-04-27

## 2022-04-24 RX ORDER — POLYETHYLENE GLYCOL 3350 17 G/17G
17 POWDER, FOR SOLUTION ORAL AT BEDTIME
Refills: 0 | Status: DISCONTINUED | OUTPATIENT
Start: 2022-04-26 | End: 2022-04-27

## 2022-04-24 RX ORDER — OXYCODONE HYDROCHLORIDE 5 MG/1
5 TABLET ORAL
Refills: 0 | Status: DISCONTINUED | OUTPATIENT
Start: 2022-04-26 | End: 2022-04-27

## 2022-04-24 RX ORDER — SODIUM CHLORIDE 9 MG/ML
1000 INJECTION, SOLUTION INTRAVENOUS
Refills: 0 | Status: DISCONTINUED | OUTPATIENT
Start: 2022-04-26 | End: 2022-04-27

## 2022-04-24 RX ORDER — ONDANSETRON 8 MG/1
4 TABLET, FILM COATED ORAL EVERY 6 HOURS
Refills: 0 | Status: DISCONTINUED | OUTPATIENT
Start: 2022-04-26 | End: 2022-04-27

## 2022-04-24 RX ORDER — OXYCODONE HYDROCHLORIDE 5 MG/1
10 TABLET ORAL
Refills: 0 | Status: DISCONTINUED | OUTPATIENT
Start: 2022-04-26 | End: 2022-04-27

## 2022-04-24 RX ORDER — SENNA PLUS 8.6 MG/1
2 TABLET ORAL AT BEDTIME
Refills: 0 | Status: DISCONTINUED | OUTPATIENT
Start: 2022-04-26 | End: 2022-04-27

## 2022-04-25 PROCEDURE — 77280 THER RAD SIMULAJ FIELD SMPL: CPT | Mod: 26

## 2022-04-25 PROCEDURE — 77431 RADIATION THERAPY MANAGEMENT: CPT

## 2022-04-25 PROCEDURE — 77300 RADIATION THERAPY DOSE PLAN: CPT | Mod: 26

## 2022-04-25 NOTE — PATIENT PROFILE ADULT - FALL HARM RISK - UNIVERSAL INTERVENTIONS
Bed in lowest position, wheels locked, appropriate side rails in place/Call bell, personal items and telephone in reach/Instruct patient to call for assistance before getting out of bed or chair/Non-slip footwear when patient is out of bed/[place] to call system/Physically safe environment - no spills, clutter or unnecessary equipment/Purposeful Proactive Rounding/Room/bathroom lighting operational, light cord in reach

## 2022-04-25 NOTE — PATIENT PROFILE ADULT - DOMESTIC TRAVEL HIGH RISK QUESTION
[facility]   AMBULATORY ENCOUNTER  FAMILY MEDICINE OFFICE VISIT    CHIEF COMPLAINT:  Chief Complaint   Patient presents with   • Follow-up     follow up        Walter Kendall is accompanied by his wife. Permission obtained from patient to discuss his health and health history in front of the accompanying party.    SUBJECTIVE:  Walter Kendall is a 45 year old male who presented today for 6 month follow-up of his chronic conditions including asthma, thyroid disease, erectile dysfunction, vitamin-D deficiency.  Patient reports compliance in taking his medications without side effects specifically no chest pain, palpitations, cough, nausea, vomiting, diarrhea.  Patient has the following concerns today:    Erectile dysfunction:  Patient and his wife report that patient use to have good sexual activity.  It seems like it suddenly decreased.  He is had the Viagra 100 mg which seems to work for him however there is some concern between them that the medication is covering up something else.  Patient does note that his dad had low testosterone.  They would like to have this tested today.     Psoriasis:  Patient and his wife commented that he has skin rash that is significantly worsening on his arms, legs, abdomen, and scalp.  Patient reports that is at times itchy but at other times does not seem to affect a much.    Left ear:  Patient reports approximately year and half ago he had a significant left ear infection.  He does note increased my fullness and dulled voices.  He denies pain, drainage, warmth, fevers.     REVIEW OF SYSTEMS:    All other systems are reviewed and are negative except as documented in the history of present illness.       OBJECTIVE:  PROBLEM LIST:    Patient Active Problem List   Diagnosis   • Asthma   • Chemical burn of respiratory tract   • Thyroid disease   • Erectile dysfunction due to diseases classified elsewhere   • Vitamin D deficiency   • Psoriasis       PAST HISTORIES:  I have  reviewed the patient's social history, substance use, medications, allergies, immunizations, medical history, surgical history, and family history listed in the medical record as obtained by the clinical staff and agree with their documentation.    PHYSICAL EXAM:    Vitals:    01/14/20 1245   BP: 112/68   Pulse: 68   Resp: 16   Temp: 97 °F (36.1 °C)   TempSrc: Temporal   SpO2: 97%   Weight: 97.6 kg   Height: 5' 7\" (1.702 m)      Pulse Ox Interpretation: Within normal limits.  Constitutional: Well developed, well nourished, appears stated age, alert and oriented, cooperative, in no apparent distress.  Head: Normocephalic, without obvious abnormality, atraumatic.  Eyes: Sclerae white, conjunctivae are normal, corneas clear, lids and lashes are normal, visual acuity is grossly normal; bilaterally.  Ears: Pinna and external ear is normal bilaterally, right external auditory canal is clear and normal, tympanic membrane is pearly gray with sharp cone of light, middle ear space is normal.  Left external auditory ear canal is cerumen impacted.  Unable to visualize tympanic membrane.  There is no discomfort on traction of the pinna or palpation of the tragus and auditory acuity is grossly normal; bilaterally.  Nose/Sinus: External nose is normal to inspection, Septum midline, nasal passages are normal, No sinus tenderness.  Mouth: The soft palate is symmetrical without lesion. The oral mucosa is moist and intact without thrush and gingiva are normal. The tongue is midline and appears normal. The uvula is midline without edema or erythema.   Throat: The posterior pharynx is normalwithout lesion.  No tonsillar hypertrophy, erythema or exudate.  Neck: Supple, symmetrical, trachea midline. No cervical or supraclavicular lymphadenopathy.   Respiratory: Clear to auscultation with normal inspiratory/expiratory sounds, no wheezes, no rales, no rhonchi, non labored respirations, no visible use of accessory muscles.  Cardiovascular:  Normal PMI, regular rate and rhythm, S1 and S2 normal, no S3 or S4, no murmurs, splits, clicks, rubs, or gallops.   Abdominal: Round, soft, nontender, normoactive bowel sounds in all four quadrants, without masses, no hepatosplenomegaly, without pulsatile masses.   Musculoskeletal: No clubbing, no cyanosis, no edema, bilaterally. No erythema, no deformity, no tenderness upon palpation, no evidence of joint effusion, normal muscle tone (5+/5+)with full range of motion, no evidence of joint instability or crepitation.   Neurological:  normal sensation and deep tendon reflexes 2+ bilaterally. No focal deficits or lateralizing signs.   Integumentary:  Several silver scale lesions with erythematous base on the right lower extremity, left lower extremity, right upper extremity and dorsal surface of the hand, he lesions noted on the lower right abdomen.  The scalp has numerous small macular erythematous lesions.  Normal color for ethnicity, vascularity normal, no evidence of bleeding or bruising, no atypical-appearing skin lesions, no rashes, no edema, temperature normal, normal texture, good turgor, nails normal without clubbing.  Otherwise skin is Normal color for ethnicity, no evidence of bleeding or bruising, no edema.   Psychiatric: Appropriate mood and affect.  Normal thought processes, speech, memory, attention span/concentration, judgment, insight, reliability.    ASSESSMENT:    This is a 45 year old male with  1. Chronic disease /disorder    2. Mild intermittent asthma without complication    3. Thyroid disease    4. Erectile dysfunction, unspecified erectile dysfunction type    5. Vitamin D deficiency    6. Elevated cholesterol    7. Impacted cerumen of left ear    8. Psoriasis        PLAN:  Chronic disease /disorder    Mild intermittent asthma without complication  - albuterol 108 (90 Base) MCG/ACT inhaler; Inhale 2 puffs into the lungs as needed for Shortness of Breath or Wheezing.  Dispense: 1 Inhaler; Refill:  3  - CBC WITH DIFFERENTIAL; Future    Thyroid disease  Continue levothyroxine 125 mcg daily.  - COMPREHENSIVE METABOLIC PANEL; Future  - THYROID STIMULATING HORMONE REFLEX; Future    Erectile dysfunction, unspecified erectile dysfunction type  Continue sildenafil 100 mg as needed.  - TESTOSTERONE TOTAL-MALE; Future    Vitamin D deficiency  Vitamin-D 05166 units weekly.  - VITAMIN D -25 HYDROXY; Future    Elevated cholesterol  Consume low saturated fat diet.  Increase cardiovascular exercise.  - LIPID PANEL WITH REFLEX; Future    Impacted cerumen of left ear  Ear lavage today see documentation.    Psoriasis  Ketoconazole 1% shampoo 2 to 3 times a week.  Consider topical clobetasol 0.05% or other high potency steroid cream for psoriatic rash.  Consider referral to rheumatology.    Return in about 6 months (around 7/14/2020) for Medication Follow Up with labs, labs today.  .  Six-month labs to include CMP, FLP, vitamin-D, TSH.    HEALTH CARE MAINTENANCE:    Health Maintenance Summary     Pneumococcal Vaccine 0-64 (1 of 1 - PPSV23)  Overdue since 4/4/1978    DTaP/Tdap/Td Vaccine (1 - Tdap)  Overdue since 4/4/1991    Influenza Vaccine (1)  Overdue since 9/1/2019    Depression Screening (Yearly)  Due soon on 7/11/2020    Meningococcal Vaccine   Aged Out        Healthcare maintenance as obtained by the clinical staff was reviewed and agree with their documentation. Topics discussed with patient and updated, appropriate orders completed if agreed upon by patient. Health Maintenance recommendation(s) discussed with the patient:  Patient declines immunizations for tetanus, pneumococcal, influenza..     Patient instructions provided as documented in the After Visit Summary.  All the above plan and medication(s) side effect profile was discussed with the patient in detail, questions were answered to the patient's/caregiver's satisfaction. The patient/caregiver verbalized understanding and was agreeable to the above plan.  Patient/caregiver was instructed to follow up with any questions or concerns. Patient left the office in stable condition with verbal and written instructions.    Patient/caregiver was encouraged to consider use of [facility] for review of health information, for communication regarding health concerns and questions and to easily schedule appointments and make payments.      Patient/caregiver would like communication of their results via:      Cell Phone:   Telephone Information:   Mobile [phone]     Okay to leave a message containing results? Yes.     Time spent with Walter Kendall = 35 minutes.  More than half the time was spent in counseling about the listed medical problems and coordination of care.  Discussion today included importance of compliance with treatment, risk factor reduction, patient education, and instructions for management, treatment and follow-up care.   No

## 2022-04-26 ENCOUNTER — RESULT REVIEW (OUTPATIENT)
Age: 62
End: 2022-04-26

## 2022-04-26 ENCOUNTER — TRANSCRIPTION ENCOUNTER (OUTPATIENT)
Age: 62
End: 2022-04-26

## 2022-04-26 ENCOUNTER — INPATIENT (INPATIENT)
Facility: HOSPITAL | Age: 62
LOS: 0 days | Discharge: HOME | DRG: 470 | End: 2022-04-27
Attending: ORTHOPAEDIC SURGERY | Admitting: ORTHOPAEDIC SURGERY
Payer: COMMERCIAL

## 2022-04-26 ENCOUNTER — APPOINTMENT (OUTPATIENT)
Dept: ORTHOPEDIC SURGERY | Facility: HOSPITAL | Age: 62
End: 2022-04-26

## 2022-04-26 VITALS
SYSTOLIC BLOOD PRESSURE: 145 MMHG | TEMPERATURE: 98 F | DIASTOLIC BLOOD PRESSURE: 68 MMHG | RESPIRATION RATE: 20 BRPM | HEART RATE: 87 BPM | OXYGEN SATURATION: 98 % | WEIGHT: 191.58 LBS | HEIGHT: 67 IN

## 2022-04-26 DIAGNOSIS — M16.11 UNILATERAL PRIMARY OSTEOARTHRITIS, RIGHT HIP: ICD-10-CM

## 2022-04-26 DIAGNOSIS — K08.409 PARTIAL LOSS OF TEETH, UNSPECIFIED CAUSE, UNSPECIFIED CLASS: Chronic | ICD-10-CM

## 2022-04-26 LAB
ANION GAP SERPL CALC-SCNC: 9 MMOL/L — SIGNIFICANT CHANGE UP (ref 5–17)
BUN SERPL-MCNC: 12 MG/DL — SIGNIFICANT CHANGE UP (ref 7–23)
CALCIUM SERPL-MCNC: 8.6 MG/DL — SIGNIFICANT CHANGE UP (ref 8.4–10.5)
CHLORIDE SERPL-SCNC: 106 MMOL/L — SIGNIFICANT CHANGE UP (ref 96–108)
CO2 SERPL-SCNC: 26 MMOL/L — SIGNIFICANT CHANGE UP (ref 22–31)
CREAT SERPL-MCNC: 0.81 MG/DL — SIGNIFICANT CHANGE UP (ref 0.5–1.3)
EGFR: 98 ML/MIN/1.73M2 — SIGNIFICANT CHANGE UP
GLUCOSE SERPL-MCNC: 186 MG/DL — HIGH (ref 70–99)
HCT VFR BLD CALC: 37.3 % — LOW (ref 39–50)
HGB BLD-MCNC: 12.1 G/DL — LOW (ref 13–17)
MCHC RBC-ENTMCNC: 28.3 PG — SIGNIFICANT CHANGE UP (ref 27–34)
MCHC RBC-ENTMCNC: 32.4 GM/DL — SIGNIFICANT CHANGE UP (ref 32–36)
MCV RBC AUTO: 87.4 FL — SIGNIFICANT CHANGE UP (ref 80–100)
NRBC # BLD: 0 /100 WBCS — SIGNIFICANT CHANGE UP (ref 0–0)
PLATELET # BLD AUTO: 151 K/UL — SIGNIFICANT CHANGE UP (ref 150–400)
POTASSIUM SERPL-MCNC: 4.7 MMOL/L — SIGNIFICANT CHANGE UP (ref 3.5–5.3)
POTASSIUM SERPL-SCNC: 4.7 MMOL/L — SIGNIFICANT CHANGE UP (ref 3.5–5.3)
RBC # BLD: 4.27 M/UL — SIGNIFICANT CHANGE UP (ref 4.2–5.8)
RBC # FLD: 12.6 % — SIGNIFICANT CHANGE UP (ref 10.3–14.5)
SODIUM SERPL-SCNC: 141 MMOL/L — SIGNIFICANT CHANGE UP (ref 135–145)
WBC # BLD: 11.77 K/UL — HIGH (ref 3.8–10.5)
WBC # FLD AUTO: 11.77 K/UL — HIGH (ref 3.8–10.5)

## 2022-04-26 PROCEDURE — 99222 1ST HOSP IP/OBS MODERATE 55: CPT

## 2022-04-26 PROCEDURE — 88311 DECALCIFY TISSUE: CPT | Mod: 26

## 2022-04-26 PROCEDURE — 88305 TISSUE EXAM BY PATHOLOGIST: CPT | Mod: 26

## 2022-04-26 PROCEDURE — 27130 TOTAL HIP ARTHROPLASTY: CPT | Mod: RT

## 2022-04-26 DEVICE — CUP ACET ALTEON CLUS HOLE POROUS COAT GRP 6 54MM: Type: IMPLANTABLE DEVICE | Site: RIGHT | Status: FUNCTIONAL

## 2022-04-26 DEVICE — HEAD FEM BIOLOX DELTA 36MM PLUS 3.5MM: Type: IMPLANTABLE DEVICE | Site: RIGHT | Status: FUNCTIONAL

## 2022-04-26 DEVICE — LIGATING CLIPS TELEFLEX HORIZON MD/LG: Type: IMPLANTABLE DEVICE | Site: RIGHT | Status: FUNCTIONAL

## 2022-04-26 DEVICE — IMPLANTABLE DEVICE: Type: IMPLANTABLE DEVICE | Site: RIGHT | Status: FUNCTIONAL

## 2022-04-26 DEVICE — BONE WAX 2.5GM: Type: IMPLANTABLE DEVICE | Site: RIGHT | Status: FUNCTIONAL

## 2022-04-26 DEVICE — SCREW BONE ALTEON 6.5X20MM: Type: IMPLANTABLE DEVICE | Site: RIGHT | Status: FUNCTIONAL

## 2022-04-26 DEVICE — HEMOCLIP MED BLUE 6 CARTRIDGE TITANIUM: Type: IMPLANTABLE DEVICE | Site: RIGHT | Status: FUNCTIONAL

## 2022-04-26 DEVICE — ROD REAMING W/BALL TIP 2.5X650MM STRL: Type: IMPLANTABLE DEVICE | Site: RIGHT | Status: FUNCTIONAL

## 2022-04-26 DEVICE — COIL NIT OCCLD PDA 6X5MM: Type: IMPLANTABLE DEVICE | Site: RIGHT | Status: FUNCTIONAL

## 2022-04-26 DEVICE — LINER ACET ALTEON XLE NEUT GRP 6 36MM: Type: IMPLANTABLE DEVICE | Site: RIGHT | Status: FUNCTIONAL

## 2022-04-26 DEVICE — BLLN PTA ANGIOSCULPT 4X40: Type: IMPLANTABLE DEVICE | Site: RIGHT | Status: FUNCTIONAL

## 2022-04-26 DEVICE — HEMOCLIP LG ORANGE 6 CARTRIDGE TITANIUM: Type: IMPLANTABLE DEVICE | Site: RIGHT | Status: FUNCTIONAL

## 2022-04-26 RX ORDER — CHLORHEXIDINE GLUCONATE 213 G/1000ML
1 SOLUTION TOPICAL ONCE
Refills: 0 | Status: COMPLETED | OUTPATIENT
Start: 2022-04-26 | End: 2022-04-26

## 2022-04-26 RX ORDER — TRANEXAMIC ACID 100 MG/ML
1000 INJECTION, SOLUTION INTRAVENOUS ONCE
Refills: 0 | Status: COMPLETED | OUTPATIENT
Start: 2022-04-26 | End: 2022-04-26

## 2022-04-26 RX ORDER — SODIUM CHLORIDE 9 MG/ML
500 INJECTION INTRAMUSCULAR; INTRAVENOUS; SUBCUTANEOUS ONCE
Refills: 0 | Status: COMPLETED | OUTPATIENT
Start: 2022-04-26 | End: 2022-04-26

## 2022-04-26 RX ORDER — HYDROMORPHONE HYDROCHLORIDE 2 MG/ML
0.5 INJECTION INTRAMUSCULAR; INTRAVENOUS; SUBCUTANEOUS
Refills: 0 | Status: DISCONTINUED | OUTPATIENT
Start: 2022-04-26 | End: 2022-04-27

## 2022-04-26 RX ORDER — ACETAMINOPHEN 500 MG
1000 TABLET ORAL ONCE
Refills: 0 | Status: COMPLETED | OUTPATIENT
Start: 2022-04-26 | End: 2022-04-26

## 2022-04-26 RX ORDER — ONDANSETRON 8 MG/1
4 TABLET, FILM COATED ORAL ONCE
Refills: 0 | Status: DISCONTINUED | OUTPATIENT
Start: 2022-04-26 | End: 2022-04-27

## 2022-04-26 RX ORDER — APREPITANT 80 MG/1
40 CAPSULE ORAL ONCE
Refills: 0 | Status: COMPLETED | OUTPATIENT
Start: 2022-04-26 | End: 2022-04-26

## 2022-04-26 RX ORDER — OXYCODONE HYDROCHLORIDE 5 MG/1
5 TABLET ORAL ONCE
Refills: 0 | Status: DISCONTINUED | OUTPATIENT
Start: 2022-04-26 | End: 2022-04-27

## 2022-04-26 RX ORDER — CEFAZOLIN SODIUM 1 G
2000 VIAL (EA) INJECTION EVERY 8 HOURS
Refills: 0 | Status: COMPLETED | OUTPATIENT
Start: 2022-04-26 | End: 2022-04-27

## 2022-04-26 RX ORDER — ATORVASTATIN CALCIUM 80 MG/1
80 TABLET, FILM COATED ORAL AT BEDTIME
Refills: 0 | Status: DISCONTINUED | OUTPATIENT
Start: 2022-04-26 | End: 2022-04-27

## 2022-04-26 RX ORDER — CEFAZOLIN SODIUM 1 G
2000 VIAL (EA) INJECTION ONCE
Refills: 0 | Status: COMPLETED | OUTPATIENT
Start: 2022-04-26 | End: 2022-04-26

## 2022-04-26 RX ORDER — DEXAMETHASONE 0.5 MG/5ML
8 ELIXIR ORAL ONCE
Refills: 0 | Status: COMPLETED | OUTPATIENT
Start: 2022-04-27 | End: 2022-04-27

## 2022-04-26 RX ORDER — APIXABAN 2.5 MG/1
2.5 TABLET, FILM COATED ORAL EVERY 12 HOURS
Refills: 0 | Status: DISCONTINUED | OUTPATIENT
Start: 2022-04-27 | End: 2022-04-27

## 2022-04-26 RX ORDER — SODIUM CHLORIDE 9 MG/ML
1000 INJECTION, SOLUTION INTRAVENOUS
Refills: 0 | Status: DISCONTINUED | OUTPATIENT
Start: 2022-04-26 | End: 2022-04-27

## 2022-04-26 RX ADMIN — Medication 1000 MILLIGRAM(S): at 21:22

## 2022-04-26 RX ADMIN — ATORVASTATIN CALCIUM 80 MILLIGRAM(S): 80 TABLET, FILM COATED ORAL at 21:22

## 2022-04-26 RX ADMIN — SODIUM CHLORIDE 75 MILLILITER(S): 9 INJECTION, SOLUTION INTRAVENOUS at 15:56

## 2022-04-26 RX ADMIN — APREPITANT 40 MILLIGRAM(S): 80 CAPSULE ORAL at 10:53

## 2022-04-26 RX ADMIN — Medication 400 MILLIGRAM(S): at 21:22

## 2022-04-26 RX ADMIN — SODIUM CHLORIDE 500 MILLILITER(S): 9 INJECTION INTRAMUSCULAR; INTRAVENOUS; SUBCUTANEOUS at 16:01

## 2022-04-26 RX ADMIN — CHLORHEXIDINE GLUCONATE 1 APPLICATION(S): 213 SOLUTION TOPICAL at 10:53

## 2022-04-26 RX ADMIN — Medication 100 MILLIGRAM(S): at 20:12

## 2022-04-26 RX ADMIN — SODIUM CHLORIDE 500 MILLILITER(S): 9 INJECTION INTRAMUSCULAR; INTRAVENOUS; SUBCUTANEOUS at 18:38

## 2022-04-26 NOTE — PHYSICAL THERAPY INITIAL EVALUATION ADULT - IMPAIRED TRANSFERS: SIT/STAND, REHAB EVAL
impaired balance/narrow base of support/impaired postural control/decreased ROM/decreased sensation/decreased strength

## 2022-04-26 NOTE — CONSULT NOTE ADULT - PROBLEM SELECTOR RECOMMENDATION 5
was started on B/L intermittent pneumatic compression device for DVT prophylaxis, pharmacological DVT prophylaxis as per orthopedic surgery team.

## 2022-04-26 NOTE — PHYSICAL THERAPY INITIAL EVALUATION ADULT - PERTINENT HX OF CURRENT PROBLEM, REHAB EVAL
63 yo male presents with c/o bilateral hip and groin pain. Left hip has been painful for a longer period of time but the right hip is more painful. Pain in the right hip and groin started in October 2021. He was treated initially with PT with improved strength but no change in pain. Pain currently 3/10 at rest and increased to 6-81/0 with activity. Pain is mildly relieved with diclofenac and tylenol.

## 2022-04-26 NOTE — DISCHARGE NOTE PROVIDER - NSDCMRMEDTOKEN_GEN_ALL_CORE_FT
amino acid synergy: 2 cap(s) orally 3 times a week  before and after workouts  arthrosoothe: 1 cap(s) orally 2 times a day  diclofenac sodium 75 mg oral delayed release tablet: 1 tab(s) orally 2 times a day  eyesuvis: 1 drop(s) in the left eye once a day  omegaavail: 1 tab(s) orally once a day  rosuvastatin 20 mg oral tablet: 1 tab(s) orally once a day (in the afternoon)  Tylenol 500 mg oral tablet: 2 tab(s) orally once a day, As Needed - for severe pain  Vitamin C 1000 mg oral tablet: 1 tab(s) orally once a day  vitamin E 180 mg oral capsule: 1 cap(s) orally once a day   acetaminophen 500 mg oral tablet: 2 tab(s) orally every 8 hours  amino acid synergy: 2 cap(s) orally 3 times a week  before and after workouts  apixaban 2.5 mg oral tablet: 1 tab(s) orally every 12 hours  apixaban 2.5 mg oral tablet: 1 tab(s) orally every 12 hours  arthrosoothe: 1 cap(s) orally 2 times a day  celecoxib 200 mg oral capsule: 1 cap(s) orally every 12 hours  eyesuvis: 1 drop(s) in the left eye once a day  omegaavail: 1 tab(s) orally once a day  omeprazole 20 mg oral delayed release capsule: 1 cap(s) orally once a day  before breakfast   oxyCODONE 5 mg oral tablet: 1-2 tab(s) orally every 4 hours, As Needed -Moderate to severe Pain MDD:6  [id number]  polyethylene glycol 3350 oral powder for reconstitution: 17 gram(s) orally once a day (at bedtime)  rosuvastatin 20 mg oral tablet: 1 tab(s) orally once a day (in the afternoon)  senna oral tablet: 2 tab(s) orally once a day (at bedtime)  Vitamin C 1000 mg oral tablet: 1 tab(s) orally once a day  vitamin E 180 mg oral capsule: 1 cap(s) orally once a day   acetaminophen 500 mg oral tablet: 2 tab(s) orally every 8 hours  amino acid synergy: 2 cap(s) orally 3 times a week  before and after workouts  apixaban 2.5 mg oral tablet: 1 tab(s) orally every 12 hours  apixaban 2.5 mg oral tablet: 1 tab(s) orally every 12 hours  arthrosoothe: 1 cap(s) orally 2 times a day  eyesuvis: 1 drop(s) in the left eye once a day  meloxicam 15 mg oral tablet: 1 tab(s) orally once a day   omegaavail: 1 tab(s) orally once a day  omeprazole 20 mg oral delayed release capsule: 1 cap(s) orally once a day  before breakfast   oxyCODONE 5 mg oral tablet: 1-2 tab(s) orally every 4 hours, As Needed -Moderate to severe Pain MDD:6  [id number]  polyethylene glycol 3350 oral powder for reconstitution: 17 gram(s) orally once a day (at bedtime)  rosuvastatin 20 mg oral tablet: 1 tab(s) orally once a day (in the afternoon)  senna oral tablet: 2 tab(s) orally once a day (at bedtime)  Vitamin C 1000 mg oral tablet: 1 tab(s) orally once a day  vitamin E 180 mg oral capsule: 1 cap(s) orally once a day

## 2022-04-26 NOTE — PHYSICAL THERAPY INITIAL EVALUATION ADULT - ADDITIONAL COMMENTS
Pt lives in private home with wife. Home has 3 DEENA without handrail and full flight upstairs inside. Pt has RW at home. Was independent prior to surgery. Wife will be available to assist pt as needed.

## 2022-04-26 NOTE — DISCHARGE NOTE PROVIDER - CARE PROVIDER_API CALL
GABRIEL Laboy (MD)  Orthopaedic Surgery  [address], [address]  [place] [address]  Phone: [phone]  Fax: [phone]  Established Patient  Scheduled Appointment: 05/11/2022 09:15 AM

## 2022-04-26 NOTE — CONSULT NOTE ADULT - PROBLEM SELECTOR RECOMMENDATION 4
prediabetic, glycohemoglobin level of 5.9 about 3 weeks ago, started him on consistent carbohydrate diet, monitor plasma glucose.

## 2022-04-26 NOTE — DISCHARGE NOTE PROVIDER - NSDCCPTREATMENT_GEN_ALL_CORE_FT
PRINCIPAL PROCEDURE  Procedure: Total replacement of right hip joint by anterior approach  Findings and Treatment: osteoarthritis right hip

## 2022-04-26 NOTE — BRIEF OPERATIVE NOTE - COMMENTS
implants : acet 54 mm alteon cluster cup w/ 1 screw and 0 degree xle Vit E liner, femur #6 alteon HA standard offset collarless stem w/ 36+3.5 biolox head

## 2022-04-26 NOTE — CONSULT NOTE ADULT - PROBLEM SELECTOR RECOMMENDATION 2
Anemia of acute blood loss, hemoglobin dropped by 3 gm/dl, no current active bleeding, asymptomatic, monitor.

## 2022-04-26 NOTE — CONSULT NOTE ADULT - PROBLEM SELECTOR RECOMMENDATION 3
ML 2ry to stress hormones in relation to surgical trauma & acute blood loss, no evidence suggestive of infection, trend.

## 2022-04-26 NOTE — PHYSICAL THERAPY INITIAL EVALUATION ADULT - PATIENT/FAMILY AGREES WITH PLAN
Patient discharged to home in stable condition. Written and verbal after care 
instructions given. Patient verbalizes understanding of instruction.IV removed. 
Catheter intact and site benign. Pressure and 4x4 applied to site. No bleeding 
noted.
patient presented to the ER c/o "cough/congestion/HA x7days now worse have 
really bad nausea". on room air, breathing evenly and unlabored. Connected to 
the monitor and pulse ox. Denies any pain at this time. Kept comfortable, will 
continue to monitor accordingly.
yes

## 2022-04-26 NOTE — DISCHARGE NOTE PROVIDER - PROVIDER TOKENS
PROVIDER:[TOKEN:[[id number]],SCHEDULEDAPPT:[05/11/2022],SCHEDULEDAPPTTIME:[09:15 AM],ESTABLISHEDPATIENT:[T]]

## 2022-04-26 NOTE — DISCHARGE NOTE PROVIDER - HOSPITAL COURSE
The patient is a 62 y.o. male with severe osteoarthritis of the right hip.  He was evaluated by the PST dept at [facility] and obtained the appropriate medical clearances.  After admission on 4/26/22 and receiving pre-operative parenteral prophylactic antibiotics (ancef), the patient  underwent an  uncomplicated right anterior MAK by orthopedic surgeon Dr. Timoteo Laboy.    A medical consultation from the Hospitalist service was obtained for post-operative medical co-management. Typical Physical & occupational therapy modalities post anterior MAK were performed including ambulation training, range of motion, ADL's, and transfers. Eliquis 2.5 mg every 12 hrs, along w/ bilat venodynes was given for DVT prophylaxis (caprini 11).  The patient had a clean appearing surgical incision with no sign of surgical site infections and had a stable neuro / vascular exam of the operated extremity.  After progression of mobility guided by the PT/ OT staff,  the patient was felt to benefit from further rehabilitative care for restoration to level of function. This was felt to best be accomplished at  home.  Discharge and Orthopedic Care instructions were delineated in the Discharge Plan and reviewed with the patient. All medications were delineated in the medication reconciliation tool and key points were reviewed with the patient. They were deemed stable from an Orthopedic & medical standpoint for discharge today.  Upon completion of Home care he will be  following up with Dr. Laboy for office  follow up Orthopedic care.   The patient is a 62 y.o. male with severe osteoarthritis of the right hip.  He was evaluated by the PST dept at [facility] and obtained the appropriate medical clearances.  After admission on 4/26/22 and receiving pre-operative parenteral prophylactic antibiotics (ancef), the patient  underwent an  uncomplicated right anterior MAK by orthopedic surgeon Dr. Timoteo Laboy.  He received preop radiation therapy on 4/25/22 for heterotopic ossification prophylaxis.  A medical consultation from the Hospitalist service was obtained for post-operative medical co-management. Typical Physical & occupational therapy modalities post anterior MAK were performed including ambulation training, range of motion, ADL's, and transfers. Eliquis 2.5 mg every 12 hrs, along w/ bilat venodynes was given for DVT prophylaxis (caprini 11).  The patient had a clean appearing surgical incision with no sign of surgical site infections and had a stable neuro / vascular exam of the operated extremity.  After progression of mobility guided by the PT/ OT staff,  the patient was felt to benefit from further rehabilitative care for restoration to level of function. This was felt to best be accomplished at  home.  Discharge and Orthopedic Care instructions were delineated in the Discharge Plan and reviewed with the patient. All medications were delineated in the medication reconciliation tool and key points were reviewed with the patient. They were deemed stable from an Orthopedic & medical standpoint for discharge today.  Upon completion of Home care he will be  following up with Dr. Laboy for office  follow up Orthopedic care.

## 2022-04-26 NOTE — DISCHARGE NOTE PROVIDER - INSTRUCTIONS
For Constipation :   • Increase your water intake. Drink at least 8 glasses of water daily.  • Try adding fiber to your diet by eating fruits, vegetables and foods that are rich in grains.  • If you do experience constipation, you may take an over-the-counter stool softener/laxative such as Philly Colace, Senekot, miralax or  Milk of Magnesia.

## 2022-04-26 NOTE — CONSULT NOTE ADULT - ASSESSMENT
Asymptomatic pos-op 63 y/o M with PMH of borderline HTN, Pre-DM, Dyslipidemia, Spinal Stenosis, and OA  Asymptomatic pos-op 63 y/o M with PMH of borderline HTN, Pre-DM, Dyslipidemia, Spinal Stenosis, and OA.

## 2022-04-26 NOTE — CONSULT NOTE ADULT - PROBLEM SELECTOR RECOMMENDATION 9
s/p total hip arthroplasty, asymptomatic post-op, pain control, bowel regimen, IVF hydration, I & O monitoring, and incentive spirometry. PT & OT consults, mobilization & weight bearing as per orthopedic surgery team.

## 2022-04-26 NOTE — CONSULT NOTE ADULT - SUBJECTIVE AND OBJECTIVE BOX
This is a 61 y/o M with PMH of borderline HTN, Pre-DM, Dyslipidemia, Spinal Stenosis, and OA who presented for a total right hip arthroplasty. Patient was having B/L hip pain, R > Left over the past 7 months, right hip pain was mild, but worsens a lot on weight bearing, sharp, radiates to the groin, improves with rest & Tylenol use, also with Diclofenac use, was sent for PT without improvement, and was scheduled for the procedure for today. Routine post-op medical evaluation was requested. Patient denies any chest pain, SOB, palpitations, dizziness, headache, paranesthesias, or focal muscle weakness.            ALLERGIES:     NKDA, Pistachios.          PAST MEDICAL & SURGICAL HISTORY:    Dry eye of left side    Osteoarthritis    Borderline hypertension    Hyperlipidemia    COVID-19 vaccine series completed    Lumbar spinal stenosis    Ear stuffiness, left    [place] teeth extracted  x2 age 28          SOCIAL HISTORY:     , lives with family, non smoker, no ETOH, no drug abuse.          FAMILY HISTORY:    Family history of DVT (Father)    Family history of type 2 diabetes mellitus (Grandparent, Grandparent)    Family history of stomach cancer (Father)    Family history of kidney disease (Father)    Family history of osteoarthritis (Mother, Sibling)    FHx: dementia (Mother)    Family history of hyperlipidemia (Sibling)          MEDICATIONS  (STANDING):    acetaminophen     Tablet .. 1000 milliGRAM(s) Oral every 8 hours  apixaban 2.5 milliGRAM(s) Oral every 12 hours  atorvastatin 80 milliGRAM(s) Oral at bedtime  ceFAZolin   IVPB 2000 milliGRAM(s) IV Intermittent every 8 hours  celecoxib 200 milliGRAM(s) Oral every 12 hours  dexAMETHasone  IVPB 8 milliGRAM(s) IV Intermittent once  EYSUVIS 0.25% OPHTH 1 Drop(s) 1 Drop(s) Left EYE daily  lactated ringers. 1000 milliLiter(s) (75 mL/Hr) IV Continuous <Continuous>  lactated ringers. 1000 milliLiter(s) (125 mL/Hr) IV Continuous <Continuous>  pantoprazole    Tablet 40 milliGRAM(s) Oral before breakfast  polyethylene glycol 3350 17 Gram(s) Oral at bedtime  senna 2 Tablet(s) Oral at bedtime            MEDICATIONS  (PRN):    benzocaine 15 mG/menthol 3.6 mG Lozenge 1 Lozenge Oral every 3 hours PRN Sore Throat  HYDROmorphone  Injectable 0.5 milliGRAM(s) IV Push every 3 hours PRN breakthroughpain  HYDROmorphone  Injectable 0.5 milliGRAM(s) IV Push every 10 minutes PRN Moderate Pain (4 - 6)  magnesium hydroxide Suspension 30 milliLiter(s) Oral daily PRN Constipation  ondansetron Injectable 4 milliGRAM(s) IV Push every 6 hours PRN Nausea and/or Vomiting  ondansetron Injectable 4 milliGRAM(s) IV Push once PRN Nausea and/or Vomiting  oxyCODONE    IR 5 milliGRAM(s) Oral once PRN Moderate Pain (4 - 6)  oxyCODONE    IR 5 milliGRAM(s) Oral every 3 hours PRN Moderate Pain (4 - 6)  oxyCODONE    IR 10 milliGRAM(s) Oral every 3 hours PRN Severe Pain (7 - 10)            HOME MEDICATIONS:    amino acid synergy: 2 cap(s) orally 3 times a week  before and after workouts (26 Apr 2022 10:47)  arthrosoothe: 1 cap(s) orally 2 times a day (26 Apr 2022 10:47)  diclofenac sodium 75 mg oral delayed release tablet: 1 tab(s) orally 2 times a day (26 Apr 2022 10:47)  eyesuvis: 1 drop(s) in the left eye once a day (26 Apr 2022 10:47)  omegaavail: 1 tab(s) orally once a day (26 Apr 2022 10:47)  rosuvastatin 20 mg oral tablet: 1 tab(s) orally once a day (in the afternoon) (26 Apr 2022 10:47)  Tylenol 500 mg oral tablet: 2 tab(s) orally once a day, As Needed - for severe pain (26 Apr 2022 10:47)  Vitamin C 1000 mg oral tablet: 1 tab(s) orally once a day (26 Apr 2022 10:47)  vitamin E 180 mg oral capsule: 1 cap(s) orally once a day (26 Apr 2022 10:47)        REVIEW OF SYSTEMS:    CONSTITUTIONAL: No fever or chills.  EYES: No eye pain, visual disturbances, or discharge.  ENMT:  No difficulty hearing, vertigo, sinus or throat pain.  NECK: No pain or stiffness.	  RESPIRATORY: No cough, wheezing, or hemoptysis; No shortness of breath.  CARDIOVASCULAR: No chest pain, palpitations, dizziness, or leg swelling.  GASTROINTESTINAL: No abdominal pain, no nausea, vomiting, or hematemesis; No diarrhea or Change in bowel habits. No melena or hematochezia.  GENITOURINARY: No dysuria, frequency, hematuria, or incontinence.  NEUROLOGICAL: No headaches, focal muscle weakness, numbness, or tremors.  SKIN: No itching, burning or rashes.  MUSCULOSKELETAL: No joint swelling or pain other than surgical site, well controlled.  PSYCHIATRIC: No depression, anxiety, or agitation.  HEME/LYMPH: No easy bruising, bleeding gums, or nose bleed.  ALLERGY AND IMMUNOLOGIC: No hives or eczema.            VITAL SIGNS:  Vital Signs Last 24 Hrs  T(C): 36.9 (26 Apr 2022 23:30), Max: 36.9 (26 Apr 2022 10:49)  T(F): 98.5 (26 Apr 2022 23:30), Max: 98.5 (26 Apr 2022 23:30)  HR: 87 (26 Apr 2022 23:30) (66 - 87)  BP: 120/70 (26 Apr 2022 23:30) (120/70 - 145/68)  BP(mean): --  RR: 17 (26 Apr 2022 23:30) (11 - 20)  SpO2: 98% (26 Apr 2022 23:30) (96% - 99%)        PHYSICAL EXAM:  		  GENERAL: NAD, well-groomed, well-developed.  HEAD:  Atraumatic, Norm cephalic.  EYES: PERRLA, conjunctiva clear.  ENMT: no nasal discharge, MMM.   NECK: Supple, No JVD.  NERVOUS SYSTEM:  Alert & oriented X3, neurologically intact grossly.  CHEST/LUNG: Good air entry B/L, no rales, rhonchi, or wheezing.  HEART: Normal S1 & S2, grade II/VI VINCENT over the cardiac base propagated to the apex, no extra sounds.  ABDOMEN: Soft, non-tender, non-distended; bowel sounds present, no palpable masses or organomegaly.  EXTREMITIES:  No clubbing, cyanosis, or edema.  VASCULAR: 2+ radial, DPA / PTA pulses B/L.  SKIN: No rashes or lesions.  PSYCH: normal affect & behavior.            LABs:      04-26    141  |  106  |  12  ----------------------------<  186<H>  4.7   |  26  |  0.81    Ca    8.6      26 Apr 2022 20:13                            12.1   11.77 )-----------( 151      ( 26 Apr 2022 20:13 )             37.3         COVID-19 PCR: NotDetekirstin (24 Apr 2022 10:15)      A1C with Estimated Average Glucose (04.06.22 @ 15:55)   A1C with Estimated Average Glucose Result: 5.9: Method: Immunoassay     EKG:    As per my review shows SR at 70/min, normal ID & QTc intervals, normal QRS voltage, duration, and axis (- 30), with normal transition, no ST-T abnormality.    -     This is a 61 y/o M with PMH of borderline HTN, Pre-DM, Dyslipidemia, Spinal Stenosis, and OA who presented for a total right hip arthroplasty. Patient was having B/L hip pain, R > Left over the past 7 months, right hip pain was mild, but worsens a lot on weight bearing, sharp, radiates to the groin, improves with rest & Tylenol use, also with Diclofenac use, was sent for PT without improvement, and was scheduled for the procedure for today. Routine post-op medical evaluation was requested. Patient denies any chest pain, SOB, palpitations, dizziness, headache, paranesthesias, or focal muscle weakness.            ALLERGIES:     NKDA, Pistachios.          PAST MEDICAL & SURGICAL HISTORY:    Dry eye of left side    Osteoarthritis    Borderline hypertension    Hyperlipidemia    COVID-19 vaccine series completed    Lumbar spinal stenosis    Ear stuffiness, left    [place] teeth extracted  x2 age 28          SOCIAL HISTORY:     , lives with family, non smoker, no ETOH, no drug abuse.          FAMILY HISTORY:    Family history of DVT (Father)    Family history of type 2 diabetes mellitus (Grandparent, Grandparent)    Family history of stomach cancer (Father)    Family history of kidney disease (Father)    Family history of osteoarthritis (Mother, Sibling)    FHx: dementia (Mother)    Family history of hyperlipidemia (Sibling)          MEDICATIONS  (STANDING):    acetaminophen     Tablet .. 1000 milliGRAM(s) Oral every 8 hours  apixaban 2.5 milliGRAM(s) Oral every 12 hours  atorvastatin 80 milliGRAM(s) Oral at bedtime  ceFAZolin   IVPB 2000 milliGRAM(s) IV Intermittent every 8 hours  celecoxib 200 milliGRAM(s) Oral every 12 hours  dexAMETHasone  IVPB 8 milliGRAM(s) IV Intermittent once  EYSUVIS 0.25% OPHTH 1 Drop(s) 1 Drop(s) Left EYE daily  lactated ringers. 1000 milliLiter(s) (75 mL/Hr) IV Continuous <Continuous>  lactated ringers. 1000 milliLiter(s) (125 mL/Hr) IV Continuous <Continuous>  pantoprazole    Tablet 40 milliGRAM(s) Oral before breakfast  polyethylene glycol 3350 17 Gram(s) Oral at bedtime  senna 2 Tablet(s) Oral at bedtime            MEDICATIONS  (PRN):    benzocaine 15 mG/menthol 3.6 mG Lozenge 1 Lozenge Oral every 3 hours PRN Sore Throat  HYDROmorphone  Injectable 0.5 milliGRAM(s) IV Push every 3 hours PRN breakthroughpain  HYDROmorphone  Injectable 0.5 milliGRAM(s) IV Push every 10 minutes PRN Moderate Pain (4 - 6)  magnesium hydroxide Suspension 30 milliLiter(s) Oral daily PRN Constipation  ondansetron Injectable 4 milliGRAM(s) IV Push every 6 hours PRN Nausea and/or Vomiting  ondansetron Injectable 4 milliGRAM(s) IV Push once PRN Nausea and/or Vomiting  oxyCODONE    IR 5 milliGRAM(s) Oral once PRN Moderate Pain (4 - 6)  oxyCODONE    IR 5 milliGRAM(s) Oral every 3 hours PRN Moderate Pain (4 - 6)  oxyCODONE    IR 10 milliGRAM(s) Oral every 3 hours PRN Severe Pain (7 - 10)            HOME MEDICATIONS:    amino acid synergy: 2 cap(s) orally 3 times a week  before and after workouts (26 Apr 2022 10:47)  arthrosoothe: 1 cap(s) orally 2 times a day (26 Apr 2022 10:47)  diclofenac sodium 75 mg oral delayed release tablet: 1 tab(s) orally 2 times a day (26 Apr 2022 10:47)  eyesuvis: 1 drop(s) in the left eye once a day (26 Apr 2022 10:47)  omegaavail: 1 tab(s) orally once a day (26 Apr 2022 10:47)  rosuvastatin 20 mg oral tablet: 1 tab(s) orally once a day (in the afternoon) (26 Apr 2022 10:47)  Tylenol 500 mg oral tablet: 2 tab(s) orally once a day, As Needed - for severe pain (26 Apr 2022 10:47)  Vitamin C 1000 mg oral tablet: 1 tab(s) orally once a day (26 Apr 2022 10:47)  vitamin E 180 mg oral capsule: 1 cap(s) orally once a day (26 Apr 2022 10:47)        REVIEW OF SYSTEMS:    CONSTITUTIONAL: No fever or chills.  EYES: No eye pain, visual disturbances, or discharge.  ENMT:  No difficulty hearing, vertigo, sinus or throat pain.  NECK: No pain or stiffness.	  RESPIRATORY: No cough, wheezing, or hemoptysis; No shortness of breath.  CARDIOVASCULAR: No chest pain, palpitations, dizziness, or leg swelling.  GASTROINTESTINAL: No abdominal pain, no nausea, vomiting, or hematemesis; No diarrhea or Change in bowel habits. No melena or hematochezia.  GENITOURINARY: No dysuria, frequency, hematuria, or incontinence.  NEUROLOGICAL: No headaches, focal muscle weakness, numbness, or tremors.  SKIN: No itching, burning or rashes.  MUSCULOSKELETAL: No joint swelling or pain other than surgical site, well controlled.  PSYCHIATRIC: No depression, anxiety, or agitation.  HEME/LYMPH: No easy bruising, bleeding gums, or nose bleed.  ALLERGY AND IMMUNOLOGIC: No hives or eczema.            VITAL SIGNS:  Vital Signs Last 24 Hrs  T(C): 36.9 (26 Apr 2022 23:30), Max: 36.9 (26 Apr 2022 10:49)  T(F): 98.5 (26 Apr 2022 23:30), Max: 98.5 (26 Apr 2022 23:30)  HR: 87 (26 Apr 2022 23:30) (66 - 87)  BP: 120/70 (26 Apr 2022 23:30) (120/70 - 145/68)  BP(mean): --  RR: 17 (26 Apr 2022 23:30) (11 - 20)  SpO2: 98% (26 Apr 2022 23:30) (96% - 99%)        PHYSICAL EXAM:  		  GENERAL: NAD, well-groomed, well-developed.  HEAD:  Atraumatic, Norm cephalic.  EYES: PERRLA, conjunctiva clear.  ENMT: no nasal discharge, MMM.   NECK: Supple, No JVD.  NERVOUS SYSTEM:  Alert & oriented X3, neurologically intact grossly.  CHEST/LUNG: Good air entry B/L, no rales, rhonchi, or wheezing.  HEART: Normal S1 & S2, grade II/VI VINCENT over the cardiac base propagated to the apex, no extra sounds.  ABDOMEN: Soft, non-tender, non-distended; bowel sounds present, no palpable masses or organomegaly.  EXTREMITIES:  No clubbing, cyanosis, or edema.  VASCULAR: 2+ radial, DPA / PTA pulses B/L.  SKIN: No rashes or lesions.  PSYCH: normal affect & behavior.            LABs:      04-26    141  |  106  |  12  ----------------------------<  186<H>  4.7   |  26  |  0.81    Ca    8.6      26 Apr 2022 20:13                            12.1   11.77 )-----------( 151      ( 26 Apr 2022 20:13 )             37.3         COVID-19 PCR: Dolly (24 Apr 2022 10:15)      A1C with Estimated Average Glucose (04.06.22 @ 15:55)   A1C with Estimated Average Glucose Result: 5.9: Method: Immunoassay     EKG:    As per my review shows SR at 70/min, normal NH & QTc intervals, normal QRS voltage, duration, and axis (- 30), with normal transition, no ST-T abnormality.

## 2022-04-26 NOTE — PHYSICAL THERAPY INITIAL EVALUATION ADULT - GAIT DEVIATIONS NOTED, PT EVAL
decreased roland/decreased velocity of limb motion/decreased step length/decreased weight-shifting ability

## 2022-04-27 ENCOUNTER — TRANSCRIPTION ENCOUNTER (OUTPATIENT)
Age: 62
End: 2022-04-27

## 2022-04-27 VITALS
DIASTOLIC BLOOD PRESSURE: 57 MMHG | OXYGEN SATURATION: 93 % | RESPIRATION RATE: 18 BRPM | SYSTOLIC BLOOD PRESSURE: 151 MMHG | HEART RATE: 66 BPM | TEMPERATURE: 98 F

## 2022-04-27 DIAGNOSIS — M16.11 UNILATERAL PRIMARY OSTEOARTHRITIS, RIGHT HIP: ICD-10-CM

## 2022-04-27 DIAGNOSIS — D62 ACUTE POSTHEMORRHAGIC ANEMIA: ICD-10-CM

## 2022-04-27 DIAGNOSIS — Z29.9 ENCOUNTER FOR PROPHYLACTIC MEASURES, UNSPECIFIED: ICD-10-CM

## 2022-04-27 DIAGNOSIS — D72.829 ELEVATED WHITE BLOOD CELL COUNT, UNSPECIFIED: ICD-10-CM

## 2022-04-27 DIAGNOSIS — R73.09 OTHER ABNORMAL GLUCOSE: ICD-10-CM

## 2022-04-27 LAB
ANION GAP SERPL CALC-SCNC: 6 MMOL/L — SIGNIFICANT CHANGE UP (ref 5–17)
BUN SERPL-MCNC: 12 MG/DL — SIGNIFICANT CHANGE UP (ref 7–23)
CALCIUM SERPL-MCNC: 8.4 MG/DL — SIGNIFICANT CHANGE UP (ref 8.4–10.5)
CHLORIDE SERPL-SCNC: 105 MMOL/L — SIGNIFICANT CHANGE UP (ref 96–108)
CO2 SERPL-SCNC: 28 MMOL/L — SIGNIFICANT CHANGE UP (ref 22–31)
CREAT SERPL-MCNC: 0.9 MG/DL — SIGNIFICANT CHANGE UP (ref 0.5–1.3)
EGFR: 95 ML/MIN/1.73M2 — SIGNIFICANT CHANGE UP
GLUCOSE SERPL-MCNC: 130 MG/DL — HIGH (ref 70–99)
HCT VFR BLD CALC: 35.2 % — LOW (ref 39–50)
HGB BLD-MCNC: 11.2 G/DL — LOW (ref 13–17)
MCHC RBC-ENTMCNC: 27.7 PG — SIGNIFICANT CHANGE UP (ref 27–34)
MCHC RBC-ENTMCNC: 31.8 GM/DL — LOW (ref 32–36)
MCV RBC AUTO: 87.1 FL — SIGNIFICANT CHANGE UP (ref 80–100)
NRBC # BLD: 0 /100 WBCS — SIGNIFICANT CHANGE UP (ref 0–0)
PLATELET # BLD AUTO: 167 K/UL — SIGNIFICANT CHANGE UP (ref 150–400)
POTASSIUM SERPL-MCNC: 3.6 MMOL/L — SIGNIFICANT CHANGE UP (ref 3.5–5.3)
POTASSIUM SERPL-SCNC: 3.6 MMOL/L — SIGNIFICANT CHANGE UP (ref 3.5–5.3)
RBC # BLD: 4.04 M/UL — LOW (ref 4.2–5.8)
RBC # FLD: 12.5 % — SIGNIFICANT CHANGE UP (ref 10.3–14.5)
SODIUM SERPL-SCNC: 139 MMOL/L — SIGNIFICANT CHANGE UP (ref 135–145)
WBC # BLD: 13.68 K/UL — HIGH (ref 3.8–10.5)
WBC # FLD AUTO: 13.68 K/UL — HIGH (ref 3.8–10.5)

## 2022-04-27 PROCEDURE — 80048 BASIC METABOLIC PNL TOTAL CA: CPT

## 2022-04-27 PROCEDURE — 97116 GAIT TRAINING THERAPY: CPT

## 2022-04-27 PROCEDURE — 88311 DECALCIFY TISSUE: CPT

## 2022-04-27 PROCEDURE — 97165 OT EVAL LOW COMPLEX 30 MIN: CPT

## 2022-04-27 PROCEDURE — C1713: CPT

## 2022-04-27 PROCEDURE — C1776: CPT

## 2022-04-27 PROCEDURE — C1889: CPT

## 2022-04-27 PROCEDURE — 99238 HOSP IP/OBS DSCHRG MGMT 30/<: CPT

## 2022-04-27 PROCEDURE — 97530 THERAPEUTIC ACTIVITIES: CPT

## 2022-04-27 PROCEDURE — 88305 TISSUE EXAM BY PATHOLOGIST: CPT

## 2022-04-27 PROCEDURE — 85027 COMPLETE CBC AUTOMATED: CPT

## 2022-04-27 PROCEDURE — 76000 FLUOROSCOPY <1 HR PHYS/QHP: CPT

## 2022-04-27 PROCEDURE — 97161 PT EVAL LOW COMPLEX 20 MIN: CPT

## 2022-04-27 PROCEDURE — 97535 SELF CARE MNGMENT TRAINING: CPT

## 2022-04-27 PROCEDURE — 36415 COLL VENOUS BLD VENIPUNCTURE: CPT

## 2022-04-27 PROCEDURE — 97110 THERAPEUTIC EXERCISES: CPT

## 2022-04-27 RX ORDER — BENZOCAINE AND MENTHOL 5; 1 G/100ML; G/100ML
1 LIQUID ORAL
Refills: 0 | Status: DISCONTINUED | OUTPATIENT
Start: 2022-04-27 | End: 2022-04-27

## 2022-04-27 RX ORDER — DICLOFENAC SODIUM 75 MG/1
1 TABLET, DELAYED RELEASE ORAL
Qty: 0 | Refills: 0 | DISCHARGE

## 2022-04-27 RX ORDER — CELECOXIB 200 MG/1
1 CAPSULE ORAL
Qty: 60 | Refills: 0
Start: 2022-04-27 | End: 2022-05-26

## 2022-04-27 RX ORDER — ACETAMINOPHEN 500 MG
2 TABLET ORAL
Qty: 0 | Refills: 0 | DISCHARGE

## 2022-04-27 RX ORDER — ACETAMINOPHEN 500 MG
2 TABLET ORAL
Qty: 0 | Refills: 0 | DISCHARGE
Start: 2022-04-27

## 2022-04-27 RX ORDER — OXYCODONE HYDROCHLORIDE 5 MG/1
1 TABLET ORAL
Qty: 42 | Refills: 0
Start: 2022-04-27 | End: 2022-05-03

## 2022-04-27 RX ORDER — OMEPRAZOLE 10 MG/1
1 CAPSULE, DELAYED RELEASE ORAL
Qty: 30 | Refills: 1
Start: 2022-04-27 | End: 2022-06-25

## 2022-04-27 RX ORDER — POLYETHYLENE GLYCOL 3350 17 G/17G
17 POWDER, FOR SOLUTION ORAL
Qty: 0 | Refills: 0 | DISCHARGE
Start: 2022-04-27

## 2022-04-27 RX ORDER — MELOXICAM 15 MG/1
1 TABLET ORAL
Qty: 30 | Refills: 0
Start: 2022-04-27

## 2022-04-27 RX ORDER — APIXABAN 2.5 MG/1
1 TABLET, FILM COATED ORAL
Qty: 9 | Refills: 0
Start: 2022-04-27

## 2022-04-27 RX ORDER — APIXABAN 2.5 MG/1
1 TABLET, FILM COATED ORAL
Qty: 60 | Refills: 0
Start: 2022-04-27 | End: 2022-05-26

## 2022-04-27 RX ORDER — SENNA PLUS 8.6 MG/1
2 TABLET ORAL
Qty: 0 | Refills: 0 | DISCHARGE
Start: 2022-04-27

## 2022-04-27 RX ADMIN — Medication 1000 MILLIGRAM(S): at 06:34

## 2022-04-27 RX ADMIN — Medication 101.6 MILLIGRAM(S): at 06:04

## 2022-04-27 RX ADMIN — Medication 100 MILLIGRAM(S): at 04:05

## 2022-04-27 RX ADMIN — BENZOCAINE AND MENTHOL 1 LOZENGE: 5; 1 LIQUID ORAL at 00:38

## 2022-04-27 RX ADMIN — BENZOCAINE AND MENTHOL 1 LOZENGE: 5; 1 LIQUID ORAL at 06:06

## 2022-04-27 RX ADMIN — PANTOPRAZOLE SODIUM 40 MILLIGRAM(S): 20 TABLET, DELAYED RELEASE ORAL at 06:04

## 2022-04-27 RX ADMIN — Medication 1000 MILLIGRAM(S): at 06:04

## 2022-04-27 RX ADMIN — CELECOXIB 200 MILLIGRAM(S): 200 CAPSULE ORAL at 10:00

## 2022-04-27 RX ADMIN — Medication 1000 MILLIGRAM(S): at 13:07

## 2022-04-27 RX ADMIN — CELECOXIB 200 MILLIGRAM(S): 200 CAPSULE ORAL at 09:43

## 2022-04-27 RX ADMIN — APIXABAN 2.5 MILLIGRAM(S): 2.5 TABLET, FILM COATED ORAL at 09:43

## 2022-04-27 NOTE — DISCHARGE NOTE NURSING/CASE MANAGEMENT/SOCIAL WORK - NSSCCONTNUM_GEN_ALL_CORE
Please contact the home care agency at  [phone] or  [phone] if you have not heard from them by 12 noon on the day after your hospital discharge.

## 2022-04-27 NOTE — OCCUPATIONAL THERAPY INITIAL EVALUATION ADULT - LIVES WITH, PROFILE
Pt lives with his wife in a private home, 3 steps to enter with a full flight inside. Pt has a walk in shower on the 1st floor and a tub on the 2nd floor./spouse

## 2022-04-27 NOTE — OCCUPATIONAL THERAPY INITIAL EVALUATION ADULT - BALANCE DISTURBANCE, SYSTEM IMPAIRMENT CONTRIBUTE, REHAB EVAL
Subjective
Subjective:
POD#2
S/P RIGHT LALO
 
NON MAJOR ISSUES OVERNIGHT
DENEIS CP, SOB, NO N+V WITH DIET
 
Objective
Vital Signs and I&Os
Vital Signs
 
 
Date Time Temp Pulse Resp B/P B/P Pulse O2 O2 Flow FiO2
 
     Mean Ox Delivery Rate 
 
01/24 0654 98.2 78 20 108/52  93   
 
01/23 2247 100.1 76 20 122/52  95 Room Air  
 
01/23 1413 99.8 85 20 112/70  99   
 
01/23 1230       Room Air  
 
01/23 0832  74  104/58     
 
 
 Intake & Output
 
 
 01/24 0800 01/24 0000 01/23 1600 01/23 0800 01/23 0000 01/22 1600
 
Intake Total 360 1020  600
 
Output Total 700 600 550  1350 
 
Balance -340 420 250 780 80 600
 
       
 
Intake, IV  20  300 150 
 
Intake, Oral 360 1000  600
 
Number  0   0 
 
Bowel      
 
Movements      
 
Output, Urine 700 600 550  1350 
 
Patient      170 lb
 
Weight      
 
Weight      Reported by Patient
 
Measurement      
 
Method      
 
 
 
Physical Exam:
CV: RRR\
LUNGS: CLEAR
ABD: SOFT, +BS
EXT: DRSG CHANGED, WOUND C/D/I
       NO CALF TENDERNESS BILAT
 
Assessment/Plan
Assessment/Plan
ORTH STABLE
 
PLAN
HOME DC/ LATER TODAY
 
 
Core Measures
 
Venous Thromboembolism
VTE Risk Factors Surgery
No Mechanical VTE Prophylaxis d/t N/A MechProphylax Ordered
No VTE Pharm Prophylaxis d/t NA PharmProphylax ordered musculoskeletal

## 2022-04-27 NOTE — DISCHARGE NOTE NURSING/CASE MANAGEMENT/SOCIAL WORK - CASE MANAGER'S NAME
Marisel Chen RN [facility] [place] [phone]/  Main office # [phone]

## 2022-04-27 NOTE — PROGRESS NOTE ADULT - SUBJECTIVE AND OBJECTIVE BOX
Orthopaedic Post Op Note    Procedure: Right Anterior THR  Surgeon: Timoteo Laboy    62y Male comfortable, without complaints. Resting in bed. Reported pain score = 0  Denies N/V, CP, SOB, numbness/tingling of extremities.    PE:  Vital Signs Last 24 Hrs  T(C): 36.9 (26 Apr 2022 15:37), Max: 36.9 (26 Apr 2022 10:49)  T(F): 98.4 (26 Apr 2022 15:37), Max: 98.4 (26 Apr 2022 10:49)  HR: 66 (26 Apr 2022 17:07) (66 - 87)  BP: 131/63 (26 Apr 2022 17:07) (128/66 - 145/68)  RR: 12 (26 Apr 2022 17:07) (11 - 20)  SpO2: 97% (26 Apr 2022 17:07) (96% - 99%)  General: Pt alert and oriented   Lungs: + BS CTA bilaterally  Heart: +S1 & S2 heard, RRR  Abd: + BS heard, soft, NT, ND  Right Hip Silverlon Dressing: C/D/I, functioning hemovac in place  Bilateral LEs:  Motor:   5/5 dorsiflexion, plantarflexion, EHL  Sensation intact to LT  2+ DP Pulses  SCDs in place    A/P: 62y Male stable POD#0 s/p Right Left THR   -  Acetaminophen, Celebrex, Oxycodone, Dilaudid  for Pain Control   - DVT ppx: - Eliquis 2.5mg q 12 h   - Preop RT for HO ppx  - total hip precautions  - PT, OT per protocol  - F/U AM Labs  DCP = home tomorrow pending PT, OT, medical clearance       
Patient is a 62y old  Male who presents with a chief complaint of right hip pain--for right anterior MAK (27 Apr 2022 08:30)      INTERVAL HPI/OVERNIGHT EVENTS: Patient seen and examined at bedside. No overnight events    MEDICATIONS  (STANDING):  acetaminophen     Tablet .. 1000 milliGRAM(s) Oral every 8 hours  apixaban 2.5 milliGRAM(s) Oral every 12 hours  atorvastatin 80 milliGRAM(s) Oral at bedtime  celecoxib 200 milliGRAM(s) Oral every 12 hours  EYSUVIS 0.25% OPHTH 1 Drop(s) 1 Drop(s) Left EYE daily  lactated ringers. 1000 milliLiter(s) (125 mL/Hr) IV Continuous <Continuous>  pantoprazole    Tablet 40 milliGRAM(s) Oral before breakfast  polyethylene glycol 3350 17 Gram(s) Oral at bedtime  senna 2 Tablet(s) Oral at bedtime    MEDICATIONS  (PRN):  benzocaine 15 mG/menthol 3.6 mG Lozenge 1 Lozenge Oral every 3 hours PRN Sore Throat  HYDROmorphone  Injectable 0.5 milliGRAM(s) IV Push every 3 hours PRN breakthroughpain  magnesium hydroxide Suspension 30 milliLiter(s) Oral daily PRN Constipation  ondansetron Injectable 4 milliGRAM(s) IV Push every 6 hours PRN Nausea and/or Vomiting  oxyCODONE    IR 5 milliGRAM(s) Oral every 3 hours PRN Moderate Pain (4 - 6)  oxyCODONE    IR 10 milliGRAM(s) Oral every 3 hours PRN Severe Pain (7 - 10)      Allergies    No Known Drug Allergies  Pistachios (Other)    Intolerances        REVIEW OF SYSTEMS:  CONSTITUTIONAL: No fever or chills  HEENT:  No headache, no sore throat  RESPIRATORY: No cough, wheezing, or shortness of breath  CARDIOVASCULAR: No chest pain, palpitations, or leg swelling  GASTROINTESTINAL: No abd pain, nausea, vomiting, or diarrhea  GENITOURINARY: No dysuria, frequency, or hematuria  NEUROLOGICAL: no focal weakness or dizziness  MUSCULOSKELETAL: no myalgias     Vital Signs Last 24 Hrs  T(C): 36.6 (27 Apr 2022 07:51), Max: 36.9 (26 Apr 2022 10:49)  T(F): 97.9 (27 Apr 2022 07:51), Max: 98.5 (26 Apr 2022 23:30)  HR: 66 (27 Apr 2022 07:51) (66 - 87)  BP: 151/57 (27 Apr 2022 07:51) (112/53 - 151/57)  BP(mean): --  RR: 18 (27 Apr 2022 07:51) (11 - 20)  SpO2: 93% (27 Apr 2022 07:51) (93% - 99%)    PHYSICAL EXAM:  GENERAL: NAD  HEENT:  NCAT, moist mucous membranes  CHEST/LUNG:  CTA b/l, no rales, wheezes, or rhonchi  HEART:  RRR, S1, S2, VINCENT present   ABDOMEN:  BS+, soft, nontender, nondistended  EXTREMITIES: dressing c/d/i, no edema, cyanosis, or calf tenderness  NERVOUS SYSTEM: AA&Ox3, sensation intact    LABS:                        11.2   13.68 )-----------( 167      ( 27 Apr 2022 07:55 )             35.2     CBC Full  -  ( 27 Apr 2022 07:55 )  WBC Count : 13.68 K/uL  Hemoglobin : 11.2 g/dL  Hematocrit : 35.2 %  Platelet Count - Automated : 167 K/uL  Mean Cell Volume : 87.1 fl  Mean Cell Hemoglobin : 27.7 pg  Mean Cell Hemoglobin Concentration : 31.8 gm/dL  Auto Neutrophil # : x  Auto Lymphocyte # : x  Auto Monocyte # : x  Auto Eosinophil # : x  Auto Basophil # : x  Auto Neutrophil % : x  Auto Lymphocyte % : x  Auto Monocyte % : x  Auto Eosinophil % : x  Auto Basophil % : x    27 Apr 2022 07:55    139    |  105    |  12     ----------------------------<  130    3.6     |  28     |  0.90     Ca    8.4        27 Apr 2022 07:55          CAPILLARY BLOOD GLUCOSE              RADIOLOGY & ADDITIONAL TESTS:     Consultant(s) Notes Reviewed:  [x] YES  [ ] NO    Care Discussed with [x] Consultants  [x] Patient  [ ] Family  [ ]      [ x] Other; RN  DVT ppx  
Procedure: Right  Anterior THR  POD#: 1    S: Pt without complaints. No SOB,CP, N/V. Tolerated Fluids / Diet well.   Pain comfortable on Interval Rx  + Tylenol + Celebrex. No BM yet  + flatus, No abdominal pain.  No oxy used as yet.  Pain Rx:   acetaminophen     Tablet .. 1000 milliGRAM(s) Oral every 8 hours  celecoxib 200 milliGRAM(s) Oral every 12 hours  HYDROmorphone  Injectable 0.5 milliGRAM(s) IV Push every 3 hours PRN  ondansetron Injectable 4 milliGRAM(s) IV Push every 6 hours PRN  oxyCODONE    IR 5 milliGRAM(s) Oral every 3 hours PRN  oxyCODONE    IR 10 milliGRAM(s) Oral every 3 hours PRN    O: General: Pt Alert and oriented, On exam NAD,   VS: Vital Signs Last 24 Hrs  T(C): 36.6 (27 Apr 2022 07:51), Max: 36.9 (26 Apr 2022 10:49)  T(F): 97.9 (27 Apr 2022 07:51), Max: 98.5 (26 Apr 2022 23:30)  HR: 66 (27 Apr 2022 07:51) (66 - 87)  BP: 151/57 (27 Apr 2022 07:51) (112/53 - 151/57)  BP(mean): --  RR: 18 (27 Apr 2022 07:51) (11 - 20)  SpO2: 93% (27 Apr 2022 07:51) (93% - 99%)  Heart: RRR 2/6 syst murmur  Lungs: BS clear bilat.  Abdomen: Soft; no distention, benign exam    Ext: Right hip silverlon dressing clean and dry.  hemovac in place--will remove after breakfast.  Neurologic: Has sensation bilat. feet & toes ;  Full AROM bilat feet & toes. EHL / AT  = Bilat: 5/5 ; Sensation Present mid lateral thigh  Vascular: Feet toes warm, pink. DP = 2+. Calves soft ; w/o tenderness bilat..  VTEP: On Bilat. Venodynes +   apixaban 2.5 milliGRAM(s) Oral every 12 hours    H.O Prophylaxis: preop RT  Activity in PT yesterday Walked 25'.  Only 1 PT session due to late surg                          11.2   13.68 )-----------( 167      ( 27 Apr 2022 07:55 )             35.2     04-26    141  |  106  |  12  ----------------------------<  186<H>  4.7   |  26  |  0.81    Ca    8.6      26 Apr 2022 20:13        Hospitalist input noted    Primary Orthopedic Assessment:  • Stable from Orthopedic perspective  • Neuro motor exam stable:   • Labs: stable      Plan:   • Continue:  PT/OT/Weightbearing as tolerated with assistance of a walker/Anterior THR precautions/Ice to hip/          Incentive spirometry encouraged   • Continue DVT prophylaxis as prescribed, including use of compression devices and ankle pumps  • Continue Pain Rx  • Anterior hip precautions reviewed with patient  • Plans per Medicine  • Discharge planning – anticipated discharge is Home D/C with home care & home PT  when medically stable & cleared by PT/OT--today

## 2022-04-27 NOTE — PROGRESS NOTE ADULT - ASSESSMENT
Asymptomatic pos-op 61 y/o M with PMH of borderline HTN, Pre-DM, Dyslipidemia, Spinal Stenosis, and OA.     Problem/Recommendation - 1:  ·  Problem: Osteoarthritis of right hip.   ·  Recommendation: s/p total hip arthroplasty, asymptomatic post-op, pain control, bowel regimen, IVF hydration, I & O monitoring, and incentive spirometry. PT & OT consults, mobilization & weight bearing as per orthopedic surgery team. Patient is medically stable for discharge with close outpatient follow up once cleared by PT and ortho    VINCNET noted on exam, patient is asymptomatic. Advised to follow up with PCP for further management      Problem/Recommendation - 2:  ·  Problem: Acute blood loss as cause of postoperative anemia.   ·  Recommendation: Anemia of acute blood loss, asymptomatic      Problem/Recommendation - 3:  ·  Problem: Leukocytosis.   ·  Recommendation: ML 2ry to stress hormones in relation to surgical trauma & acute blood loss, no evidence suggestive of infection, trend.     Problem/Recommendation - 4:  ·  Problem: Abnormal glucose.   ·  Recommendation: prediabetic, glycohemoglobin level of 5.9 about 3 weeks ago, started him on consistent carbohydrate diet, monitor plasma glucose.     Problem/Recommendation - 5:  ·  Problem: Need for prophylactic measure.   ·  Recommendation: was started on B/L intermittent pneumatic compression device for DVT prophylaxis, pharmacological DVT prophylaxis as per orthopedic surgery team. UTI (urinary tract infection)

## 2022-04-27 NOTE — DISCHARGE NOTE NURSING/CASE MANAGEMENT/SOCIAL WORK - PATIENT PORTAL LINK FT
You can access the FollowMyHealth Patient Portal offered by [facility] by registering at the following website: [email or web address]. By joining [facility]’s FollowMyHealth portal, you will also be able to view your health information using other applications (apps) compatible with our system.

## 2022-04-27 NOTE — OCCUPATIONAL THERAPY INITIAL EVALUATION ADULT - LEVEL OF INDEPENDENCE: STAND/SIT, REHAB EVAL
Last seen 10/21/2020  Next appointment 06/09/2021    Received prescription renewal request for escitalopram (LEXAPRO) 5 MG tablet  Verified dosage(s) against last providers appt note   Last renewed on 10/21/2020 for 30 day supply with 5 refill(s).    Renewing Rx for 90 day supply w/0 RF         
Medication:      Pharmacy has been verified.    [] Patient completely out of medication     Patient currently has follow up appointment scheduled    Message to Prescriber: Patient is requesting a 90 day supply.  Same as last refill.                 
supervision

## 2022-05-11 ENCOUNTER — APPOINTMENT (OUTPATIENT)
Dept: ORTHOPEDIC SURGERY | Facility: CLINIC | Age: 62
End: 2022-05-11
Payer: COMMERCIAL

## 2022-05-11 PROCEDURE — 99024 POSTOP FOLLOW-UP VISIT: CPT

## 2022-05-11 PROCEDURE — 73502 X-RAY EXAM HIP UNI 2-3 VIEWS: CPT

## 2022-05-11 PROCEDURE — 72170 X-RAY EXAM OF PELVIS: CPT | Mod: RT,59

## 2022-05-11 NOTE — HISTORY OF PRESENT ILLNESS
[de-identified] : Right total hip replacement April 26, 2022 [de-identified] : Denies fever chills or neurovascular symptoms ambulating with a cane.  Denies groin or thigh pain [de-identified] : Well-nourished no distress minimal Trendelenburg leg lengths equal incision healed neurovascular intact passive motion pain-free [de-identified] : X-ray AP pelvis right hip AP lateral reveals satisfactory component alignment right total hip [de-identified] : Right total hip replacement [de-identified] : Skin tape removal physical therapy follow-up 1 month

## 2022-05-15 PROBLEM — M16.0 PRIMARY OSTEOARTHRITIS OF BOTH HIPS: Status: ACTIVE | Noted: 2021-05-27

## 2022-05-15 NOTE — VITALS
[Maximal Pain Intensity: 9/10] : 9/10 [Least Pain Intensity: 4/10] : 4/10 [Pain Location: ___] : Pain Location: [unfilled] [OTC] : OTC [NSAID/Non-Opioid] : NSAID/Non-Opioid [80: Normal activity with effort; some signs or symptoms of disease.] : 80: Normal activity with effort; some signs or symptoms of disease.

## 2022-05-15 NOTE — DISEASE MANAGEMENT
[Clinical] : TNM Stage: c [FreeTextEntry4] : prevention of heterotopic ossification [TTNM] : x [NTNM] : x [MTNM] : x [N/A] : Currently not applicable

## 2022-05-15 NOTE — HISTORY OF PRESENT ILLNESS
[FreeTextEntry1] : Mr. REYNA is a 62 year old male with end-stage osteoarthritis right greater than left hip. \par \par History of Present Illness: \par 1 year ago he developed spontaneous onset of bilateral hip pain. Pain left hip is tonic character constant over the anterior aspect of the thigh and into the groin. Right hip is episodic over the lateral aspect occasion the groin. Symptoms are not improved in response to physical therapy. Left until recently symptoms were responsive to diclofenac. Overall patient feels he has "more bad days than good days. Feels that quality life is compromised \par \par 3/16/22 - Saw Dr. Laboy - Recommendation given compromised quality life unresponsive to medical management to date; recommended total hip replacement and also recommended radiation for prophylaxis of heterotopic ossification. \par \par He presents today for discussion of pre-operative radiation therapy.

## 2022-05-15 NOTE — REVIEW OF SYSTEMS
[Nocturia] : nocturia [Joint Pain] : joint pain [Fever] : no fever [Chills] : no chills [Chest Pain] : no chest pain [Palpitations] : no palpitations [Lower Ext Edema] : no lower extremity edema [Shortness Of Breath] : no shortness of breath [Wheezing] : no wheezing [Cough] : no cough [Abdominal Pain] : no abdominal pain [Constipation] : no constipation [Diarrhea] : no diarrhea [Skin Rash] : no skin rash [Skin Wound] : no skin wound [Fainting] : no fainting [Suicidal] : not suicidal [Anxiety] : no anxiety [Depression] : no depression [Easy Bleeding] : no tendency for easy bleeding [Easy Bruising] : no tendency for easy bruising

## 2022-06-08 ENCOUNTER — APPOINTMENT (OUTPATIENT)
Dept: ORTHOPEDIC SURGERY | Facility: CLINIC | Age: 62
End: 2022-06-08
Payer: COMMERCIAL

## 2022-06-08 PROCEDURE — 99024 POSTOP FOLLOW-UP VISIT: CPT

## 2022-06-08 NOTE — HISTORY OF PRESENT ILLNESS
[de-identified] : Right total hip replacement April 26, 2022 [de-identified] : Denies fever chills or neurovascular symptoms ambulating with a cane.  Denies groin or thigh pain He is complaining of increasing pain left hip secondary to documented end-stage osteoarthritis [de-identified] : Well-nourished no distress minimal Trendelenburg leg lengths equal incision healed neurovascular intact passive motion pain-free [de-identified] : Review prior X-ray AP pelvis right hip AP lateral reveals satisfactory component alignment right total hip.  There is severe end-stage left hip osteoarthritis [de-identified] : Right total hip replacement End-stage osteoarthritis left hip [de-identified] : Patient feels that left hip symptom severity and quality life are sufficiently compromised to undergo total hip replacement

## 2022-07-01 ENCOUNTER — OUTPATIENT (OUTPATIENT)
Dept: OUTPATIENT SERVICES | Facility: HOSPITAL | Age: 62
LOS: 1 days | End: 2022-07-01
Payer: COMMERCIAL

## 2022-07-01 VITALS
DIASTOLIC BLOOD PRESSURE: 83 MMHG | SYSTOLIC BLOOD PRESSURE: 170 MMHG | OXYGEN SATURATION: 99 % | RESPIRATION RATE: 16 BRPM | TEMPERATURE: 98 F | HEIGHT: 67.5 IN | WEIGHT: 194.45 LBS | HEART RATE: 68 BPM

## 2022-07-01 DIAGNOSIS — Z96.641 PRESENCE OF RIGHT ARTIFICIAL HIP JOINT: Chronic | ICD-10-CM

## 2022-07-01 DIAGNOSIS — M16.12 UNILATERAL PRIMARY OSTEOARTHRITIS, LEFT HIP: ICD-10-CM

## 2022-07-01 DIAGNOSIS — Z98.890 OTHER SPECIFIED POSTPROCEDURAL STATES: Chronic | ICD-10-CM

## 2022-07-01 DIAGNOSIS — Z01.818 ENCOUNTER FOR OTHER PREPROCEDURAL EXAMINATION: ICD-10-CM

## 2022-07-01 DIAGNOSIS — K08.409 PARTIAL LOSS OF TEETH, UNSPECIFIED CAUSE, UNSPECIFIED CLASS: Chronic | ICD-10-CM

## 2022-07-01 LAB
A1C WITH ESTIMATED AVERAGE GLUCOSE RESULT: 5.6 % — SIGNIFICANT CHANGE UP (ref 4–5.6)
ALBUMIN SERPL ELPH-MCNC: 4.3 G/DL — SIGNIFICANT CHANGE UP (ref 3.3–5)
ALP SERPL-CCNC: 68 U/L — SIGNIFICANT CHANGE UP (ref 30–120)
ALT FLD-CCNC: 49 U/L DA — SIGNIFICANT CHANGE UP (ref 10–60)
ANION GAP SERPL CALC-SCNC: 8 MMOL/L — SIGNIFICANT CHANGE UP (ref 5–17)
APTT BLD: 31.2 SEC — SIGNIFICANT CHANGE UP (ref 27.5–35.5)
AST SERPL-CCNC: 23 U/L — SIGNIFICANT CHANGE UP (ref 10–40)
BILIRUB SERPL-MCNC: 0.8 MG/DL — SIGNIFICANT CHANGE UP (ref 0.2–1.2)
BLD GP AB SCN SERPL QL: SIGNIFICANT CHANGE UP
BUN SERPL-MCNC: 19 MG/DL — SIGNIFICANT CHANGE UP (ref 7–23)
CALCIUM SERPL-MCNC: 9.9 MG/DL — SIGNIFICANT CHANGE UP (ref 8.4–10.5)
CHLORIDE SERPL-SCNC: 103 MMOL/L — SIGNIFICANT CHANGE UP (ref 96–108)
CO2 SERPL-SCNC: 29 MMOL/L — SIGNIFICANT CHANGE UP (ref 22–31)
CREAT SERPL-MCNC: 0.8 MG/DL — SIGNIFICANT CHANGE UP (ref 0.5–1.3)
EGFR: 99 ML/MIN/1.73M2 — SIGNIFICANT CHANGE UP
ESTIMATED AVERAGE GLUCOSE: 114 MG/DL — SIGNIFICANT CHANGE UP (ref 68–114)
GLUCOSE SERPL-MCNC: 96 MG/DL — SIGNIFICANT CHANGE UP (ref 70–99)
HCT VFR BLD CALC: 44.8 % — SIGNIFICANT CHANGE UP (ref 39–50)
HGB BLD-MCNC: 13.9 G/DL — SIGNIFICANT CHANGE UP (ref 13–17)
INR BLD: 1.05 RATIO — SIGNIFICANT CHANGE UP (ref 0.88–1.16)
MCHC RBC-ENTMCNC: 26.6 PG — LOW (ref 27–34)
MCHC RBC-ENTMCNC: 31 GM/DL — LOW (ref 32–36)
MCV RBC AUTO: 85.8 FL — SIGNIFICANT CHANGE UP (ref 80–100)
MRSA PCR RESULT.: SIGNIFICANT CHANGE UP
NRBC # BLD: 0 /100 WBCS — SIGNIFICANT CHANGE UP (ref 0–0)
PLATELET # BLD AUTO: 179 K/UL — SIGNIFICANT CHANGE UP (ref 150–400)
POTASSIUM SERPL-MCNC: 4.2 MMOL/L — SIGNIFICANT CHANGE UP (ref 3.5–5.3)
POTASSIUM SERPL-SCNC: 4.2 MMOL/L — SIGNIFICANT CHANGE UP (ref 3.5–5.3)
PROT SERPL-MCNC: 8.3 G/DL — SIGNIFICANT CHANGE UP (ref 6–8.3)
PROTHROM AB SERPL-ACNC: 12.1 SEC — SIGNIFICANT CHANGE UP (ref 10.5–13.4)
RBC # BLD: 5.22 M/UL — SIGNIFICANT CHANGE UP (ref 4.2–5.8)
RBC # FLD: 13.2 % — SIGNIFICANT CHANGE UP (ref 10.3–14.5)
S AUREUS DNA NOSE QL NAA+PROBE: DETECTED
SODIUM SERPL-SCNC: 140 MMOL/L — SIGNIFICANT CHANGE UP (ref 135–145)
WBC # BLD: 9.11 K/UL — SIGNIFICANT CHANGE UP (ref 3.8–10.5)
WBC # FLD AUTO: 9.11 K/UL — SIGNIFICANT CHANGE UP (ref 3.8–10.5)

## 2022-07-01 PROCEDURE — G0463: CPT

## 2022-07-01 RX ORDER — ASCORBIC ACID 60 MG
1 TABLET,CHEWABLE ORAL
Qty: 0 | Refills: 0 | DISCHARGE

## 2022-07-01 RX ORDER — VITAMIN E 100 UNIT
1 CAPSULE ORAL
Qty: 0 | Refills: 0 | DISCHARGE

## 2022-07-01 NOTE — H&P PST ADULT - HISTORY OF PRESENT ILLNESS
63yo male patient with approximately 18 month history of progressively worsening left hip pain. He has had PT without significant improvement. He rates the pain at 6-7/10 and he is taking Diclofenac and Tylenol ES with some relief. THR was recommended and he presents today for PSTs.  61yo male patient with approximately 18 month history of progressively worsening left hip pain. He has had PT without significant improvement. He rates the pain at 6-7/10 and he is taking Diclofenac and Tylenol ES daily with some relief. THR was recommended and he presents today for PSTs.

## 2022-07-01 NOTE — H&P PST ADULT - NSICDXPASTSURGICALHX_GEN_ALL_CORE_FT
PAST SURGICAL HISTORY:  S/P colonoscopy 11/2021    S/P total right hip arthroplasty     [place] teeth extracted x2 age 28

## 2022-07-01 NOTE — H&P PST ADULT - NSANTHOSAYNRD_GEN_A_CORE
No. NIKC screening performed.  STOP BANG Legend: 0-2 = LOW Risk; 3-4 = INTERMEDIATE Risk; 5-8 = HIGH Risk

## 2022-07-01 NOTE — H&P PST ADULT - NSICDXPASTMEDICALHX_GEN_ALL_CORE_FT
PAST MEDICAL HISTORY:  Borderline hypertension     COVID-19 vaccine series completed     Dry eye of left side     Ear stuffiness, left     Hyperlipidemia     Lumbar spinal stenosis     Osteoarthritis      PAST MEDICAL HISTORY:  Borderline hypertension     Class 1 obesity with body mass index (BMI) of 30.0 to 30.9 in adult     COVID-19 vaccine series completed     Dry eye of left side     Ear stuffiness, left     Hyperlipidemia     Lumbar spinal stenosis     Osteoarthritis

## 2022-07-01 NOTE — H&P PST ADULT - FALL HARM RISK - RISK INTERVENTIONS

## 2022-07-01 NOTE — H&P PST ADULT - ASSESSMENT
63yo male patient scheduled for surgery on 7/21/2022. He will obtain Medical Clearance from his PMD. He will be NPO as per Anesthesia and will take no meds on AM of surgery- Gatorade only. He will hold Diclofenac for 7 days pre-op. Instructions reviewed and questions addressed. He denies any metal allergies. As per protocol, he will be screened for Covid19 on 7/19/2022 @ 8am.

## 2022-07-01 NOTE — H&P PST ADULT - MUSCULOSKELETAL
details… left hip/no joint swelling/no joint erythema/no joint warmth/no calf tenderness/decreased ROM/decreased ROM due to pain/decreased strength/abnormal gait

## 2022-07-05 NOTE — PROGRESS NOTE ADULT - SUBJECTIVE AND OBJECTIVE BOX
Nose culture ; MSSA detected     Patient with h/o MSSA positive in the past . Patient has Mupirocin on hand at home .Instructed patient to use 5 days before surgery . verbalized understanding

## 2022-07-08 PROBLEM — E66.9 OBESITY, UNSPECIFIED: Chronic | Status: ACTIVE | Noted: 2022-07-01

## 2022-07-11 ENCOUNTER — APPOINTMENT (OUTPATIENT)
Dept: RADIATION ONCOLOGY | Facility: CLINIC | Age: 62
End: 2022-07-11

## 2022-07-11 PROCEDURE — 77261 THER RADIOLOGY TX PLNG SMPL: CPT

## 2022-07-11 PROCEDURE — 99212 OFFICE O/P EST SF 10 MIN: CPT | Mod: 25,95

## 2022-07-11 PROCEDURE — 99202 OFFICE O/P NEW SF 15 MIN: CPT | Mod: 25,95

## 2022-07-11 NOTE — VITALS
[Maximal Pain Intensity: 8/10] : 8/10 [Least Pain Intensity: 4/10] : 4/10 [80: Normal activity with effort; some signs or symptoms of disease.] : 80: Normal activity with effort; some signs or symptoms of disease.

## 2022-07-13 ENCOUNTER — APPOINTMENT (OUTPATIENT)
Dept: ORTHOPEDIC SURGERY | Facility: CLINIC | Age: 62
End: 2022-07-13

## 2022-07-13 VITALS — WEIGHT: 191 LBS | BODY MASS INDEX: 29.98 KG/M2 | HEIGHT: 67 IN

## 2022-07-13 DIAGNOSIS — M16.12 UNILATERAL PRIMARY OSTEOARTHRITIS, LEFT HIP: ICD-10-CM

## 2022-07-13 DIAGNOSIS — Z96.641 PRESENCE OF RIGHT ARTIFICIAL HIP JOINT: ICD-10-CM

## 2022-07-13 PROCEDURE — 72170 X-RAY EXAM OF PELVIS: CPT

## 2022-07-13 PROCEDURE — 99213 OFFICE O/P EST LOW 20 MIN: CPT | Mod: 24

## 2022-07-13 NOTE — HISTORY OF PRESENT ILLNESS
[Home] : at home, [unfilled] , at the time of the visit. [Medical Office: ([facility])___] : at the medical office located in  [FreeTextEntry1] : \par Mr Milligan is a 62 year old male with bilateral hip pain s/p here to discuss radiation therapy for heterotopic hip for planned Left THR.\par \par He reports a long history of osteoarthritis affecting his hips for many years. He has participated in physical therapy without significant improvement. Earlier this year, he developed a significant progression of right hip pain, exacerbated by movement and improved with Diclofenac. At that time, x-rays of the hips demonstrate bilateral hip degenerative arthritis joint space narrowing superolateral bone-on-bone contact subchondral sclerosis. He underwent a right total hip replacement on 4/26/22, preceded by radiation therapy to the right hip on 4/25/22. He tolerated the treatments well and has vastly improved function of the right hip at this time. \par \par He reports having left hip pain for a longer period of time compared to the right without recent exacerbation, but continues to cause pain and limit his function. He continues to use Diclofenac with improvement. X-rays have revealed severe end stage osteoarthritis of the left hip. He is now scheduled for left total hip replacement on 7/21/2022.

## 2022-07-13 NOTE — DISCUSSION/SUMMARY
[de-identified] : Impression end-stage osteoarthritis Left hip\par Plan left total hip replacement given compromised quality of life and failure to respond to medical management

## 2022-07-13 NOTE — DISEASE MANAGEMENT
[Clinical] : TNM Stage: c [N/A] : Currently not applicable [TTNM] : x [NTNM] : x [MTNM] : x [de-identified] : 700cGy [de-identified] : Right Hip

## 2022-07-13 NOTE — HISTORY OF PRESENT ILLNESS
[de-identified] : Patient seen in follow-up for chronic pain left hip compromising quality of life and unresponsive to medical management to date.  Patient with established diagnosis of end-stage left hip osteoarthritis.  Status post right total hip replacement April 26, 2022 with no local symptoms.

## 2022-07-13 NOTE — LETTER CLOSING
[Consult Closing:] : Thank you for allowing me to participate in the care of this patient.  If you have any questions, please do not hesitate to contact me. [Sincerely yours,] : Sincerely yours, [FreeTextEntry3] : Kristen Tyler MD\par

## 2022-07-13 NOTE — PHYSICAL EXAM
[de-identified] : Constitutional:Well nourished , well developed and in no acute distress\par Psychiatric: Alert and oriented to time place and person.Appropriate affect \par Skin:Head, neck, arms and lower extremities:no lesions or discoloration\par HEENT: Normocephalic, EOM intact, Nasal septum midline,\par Respiratory: Unlabored respirations,no audible wheezing ,no tachypnea, no cyanosis\par Cardiovascular: no leg swelling  no ankle edema no JVD, pulse regular\par Vascular: no calf or thigh tenderness, \par Peripheral pulses; intact\par Lymphatics:No groin adenopathy,no lymphedema lower  or upper extremities\par Left hip passive range of motion restricted and painful skin intact [de-identified] : Review x-rays AP pelvis Left hip AP lateral reveals satisfactory appearance right total hip replacement left hip demonstrates end-stage osteoarthritis

## 2022-07-19 ENCOUNTER — OUTPATIENT (OUTPATIENT)
Dept: OUTPATIENT SERVICES | Facility: HOSPITAL | Age: 62
LOS: 1 days | End: 2022-07-19
Payer: COMMERCIAL

## 2022-07-19 DIAGNOSIS — Z98.890 OTHER SPECIFIED POSTPROCEDURAL STATES: Chronic | ICD-10-CM

## 2022-07-19 DIAGNOSIS — K08.409 PARTIAL LOSS OF TEETH, UNSPECIFIED CAUSE, UNSPECIFIED CLASS: Chronic | ICD-10-CM

## 2022-07-19 DIAGNOSIS — Z20.828 CONTACT WITH AND (SUSPECTED) EXPOSURE TO OTHER VIRAL COMMUNICABLE DISEASES: ICD-10-CM

## 2022-07-19 DIAGNOSIS — Z96.641 PRESENCE OF RIGHT ARTIFICIAL HIP JOINT: Chronic | ICD-10-CM

## 2022-07-19 LAB — SARS-COV-2 RNA SPEC QL NAA+PROBE: SIGNIFICANT CHANGE UP

## 2022-07-19 PROCEDURE — U0003: CPT

## 2022-07-19 PROCEDURE — U0005: CPT

## 2022-07-19 RX ORDER — OXYCODONE HYDROCHLORIDE 5 MG/1
10 TABLET ORAL
Refills: 0 | Status: DISCONTINUED | OUTPATIENT
Start: 2022-07-21 | End: 2022-07-21

## 2022-07-19 RX ORDER — MAGNESIUM HYDROXIDE 400 MG/1
30 TABLET, CHEWABLE ORAL DAILY
Refills: 0 | Status: DISCONTINUED | OUTPATIENT
Start: 2022-07-21 | End: 2022-08-04

## 2022-07-19 RX ORDER — ONDANSETRON 8 MG/1
4 TABLET, FILM COATED ORAL EVERY 6 HOURS
Refills: 0 | Status: DISCONTINUED | OUTPATIENT
Start: 2022-07-21 | End: 2022-08-04

## 2022-07-19 RX ORDER — SENNA PLUS 8.6 MG/1
2 TABLET ORAL AT BEDTIME
Refills: 0 | Status: DISCONTINUED | OUTPATIENT
Start: 2022-07-21 | End: 2022-08-04

## 2022-07-19 RX ORDER — ACETAMINOPHEN 500 MG
1000 TABLET ORAL EVERY 8 HOURS
Refills: 0 | Status: DISCONTINUED | OUTPATIENT
Start: 2022-07-21 | End: 2022-08-04

## 2022-07-19 RX ORDER — HYDROMORPHONE HYDROCHLORIDE 2 MG/ML
0.5 INJECTION INTRAMUSCULAR; INTRAVENOUS; SUBCUTANEOUS
Refills: 0 | Status: DISCONTINUED | OUTPATIENT
Start: 2022-07-21 | End: 2022-07-21

## 2022-07-19 RX ORDER — SODIUM CHLORIDE 9 MG/ML
1000 INJECTION, SOLUTION INTRAVENOUS
Refills: 0 | Status: DISCONTINUED | OUTPATIENT
Start: 2022-07-21 | End: 2022-08-04

## 2022-07-19 RX ORDER — POLYETHYLENE GLYCOL 3350 17 G/17G
17 POWDER, FOR SOLUTION ORAL AT BEDTIME
Refills: 0 | Status: DISCONTINUED | OUTPATIENT
Start: 2022-07-21 | End: 2022-08-04

## 2022-07-19 RX ORDER — OXYCODONE HYDROCHLORIDE 5 MG/1
5 TABLET ORAL
Refills: 0 | Status: DISCONTINUED | OUTPATIENT
Start: 2022-07-21 | End: 2022-07-21

## 2022-07-19 RX ORDER — PANTOPRAZOLE SODIUM 20 MG/1
40 TABLET, DELAYED RELEASE ORAL
Refills: 0 | Status: DISCONTINUED | OUTPATIENT
Start: 2022-07-21 | End: 2022-08-04

## 2022-07-20 ENCOUNTER — FORM ENCOUNTER (OUTPATIENT)
Age: 62
End: 2022-07-20

## 2022-07-20 ENCOUNTER — TRANSCRIPTION ENCOUNTER (OUTPATIENT)
Age: 62
End: 2022-07-20

## 2022-07-20 PROCEDURE — 77280 THER RAD SIMULAJ FIELD SMPL: CPT | Mod: 26

## 2022-07-20 PROCEDURE — 77300 RADIATION THERAPY DOSE PLAN: CPT | Mod: 26

## 2022-07-20 PROCEDURE — 77431 RADIATION THERAPY MANAGEMENT: CPT

## 2022-07-20 NOTE — ASU PATIENT PROFILE, ADULT - FALL HARM RISK - RISK INTERVENTIONS

## 2022-07-20 NOTE — ASU PATIENT PROFILE, ADULT - NSICDXPASTSURGICALHX_GEN_ALL_CORE_FT
PAST SURGICAL HISTORY:  S/P colonoscopy 11/2021    S/P total right hip arthroplasty     [place] teeth extracted x2 age 28

## 2022-07-20 NOTE — ASU PATIENT PROFILE, ADULT - TEACHING/LEARNING FACTORS IMPACT ABILITY TO LEARN
Additional Notes (Optional): Advised patient to apply Vaseline to help with healing. Hide Additional Notes?: No Detail Level: Detailed none

## 2022-07-20 NOTE — ASU PATIENT PROFILE, ADULT - NSICDXPASTMEDICALHX_GEN_ALL_CORE_FT
PAST MEDICAL HISTORY:  Borderline hypertension     Class 1 obesity with body mass index (BMI) of 30.0 to 30.9 in adult     COVID-19 vaccine series completed     Dry eye of left side     Ear stuffiness, left     Hyperlipidemia     Lumbar spinal stenosis     Osteoarthritis

## 2022-07-21 ENCOUNTER — OUTPATIENT (OUTPATIENT)
Dept: OUTPATIENT SERVICES | Facility: HOSPITAL | Age: 62
LOS: 1 days | End: 2022-07-21
Payer: COMMERCIAL

## 2022-07-21 ENCOUNTER — RESULT REVIEW (OUTPATIENT)
Age: 62
End: 2022-07-21

## 2022-07-21 ENCOUNTER — APPOINTMENT (OUTPATIENT)
Dept: ORTHOPEDIC SURGERY | Facility: HOSPITAL | Age: 62
End: 2022-07-21

## 2022-07-21 ENCOUNTER — TRANSCRIPTION ENCOUNTER (OUTPATIENT)
Age: 62
End: 2022-07-21

## 2022-07-21 VITALS
OXYGEN SATURATION: 99 % | DIASTOLIC BLOOD PRESSURE: 65 MMHG | SYSTOLIC BLOOD PRESSURE: 139 MMHG | WEIGHT: 190.26 LBS | TEMPERATURE: 98 F | HEIGHT: 67 IN | RESPIRATION RATE: 12 BRPM | HEART RATE: 67 BPM

## 2022-07-21 DIAGNOSIS — Z98.890 OTHER SPECIFIED POSTPROCEDURAL STATES: Chronic | ICD-10-CM

## 2022-07-21 DIAGNOSIS — Z96.641 PRESENCE OF RIGHT ARTIFICIAL HIP JOINT: Chronic | ICD-10-CM

## 2022-07-21 DIAGNOSIS — K08.409 PARTIAL LOSS OF TEETH, UNSPECIFIED CAUSE, UNSPECIFIED CLASS: Chronic | ICD-10-CM

## 2022-07-21 DIAGNOSIS — M16.12 UNILATERAL PRIMARY OSTEOARTHRITIS, LEFT HIP: ICD-10-CM

## 2022-07-21 LAB
ANION GAP SERPL CALC-SCNC: 9 MMOL/L — SIGNIFICANT CHANGE UP (ref 5–17)
BUN SERPL-MCNC: 12 MG/DL — SIGNIFICANT CHANGE UP (ref 7–23)
CALCIUM SERPL-MCNC: 8.4 MG/DL — SIGNIFICANT CHANGE UP (ref 8.4–10.5)
CHLORIDE SERPL-SCNC: 100 MMOL/L — SIGNIFICANT CHANGE UP (ref 96–108)
CO2 SERPL-SCNC: 27 MMOL/L — SIGNIFICANT CHANGE UP (ref 22–31)
CREAT SERPL-MCNC: 1.15 MG/DL — SIGNIFICANT CHANGE UP (ref 0.5–1.3)
EGFR: 71 ML/MIN/1.73M2 — SIGNIFICANT CHANGE UP
GLUCOSE SERPL-MCNC: 242 MG/DL — HIGH (ref 70–99)
HCT VFR BLD CALC: 38.9 % — LOW (ref 39–50)
HGB BLD-MCNC: 12.4 G/DL — LOW (ref 13–17)
MCHC RBC-ENTMCNC: 26.3 PG — LOW (ref 27–34)
MCHC RBC-ENTMCNC: 31.9 GM/DL — LOW (ref 32–36)
MCV RBC AUTO: 82.6 FL — SIGNIFICANT CHANGE UP (ref 80–100)
NRBC # BLD: 0 /100 WBCS — SIGNIFICANT CHANGE UP (ref 0–0)
PLATELET # BLD AUTO: 148 K/UL — LOW (ref 150–400)
POTASSIUM SERPL-MCNC: 4.1 MMOL/L — SIGNIFICANT CHANGE UP (ref 3.5–5.3)
POTASSIUM SERPL-SCNC: 4.1 MMOL/L — SIGNIFICANT CHANGE UP (ref 3.5–5.3)
RBC # BLD: 4.71 M/UL — SIGNIFICANT CHANGE UP (ref 4.2–5.8)
RBC # FLD: 13.4 % — SIGNIFICANT CHANGE UP (ref 10.3–14.5)
SODIUM SERPL-SCNC: 136 MMOL/L — SIGNIFICANT CHANGE UP (ref 135–145)
WBC # BLD: 9.62 K/UL — SIGNIFICANT CHANGE UP (ref 3.8–10.5)
WBC # FLD AUTO: 9.62 K/UL — SIGNIFICANT CHANGE UP (ref 3.8–10.5)

## 2022-07-21 PROCEDURE — 88305 TISSUE EXAM BY PATHOLOGIST: CPT | Mod: 26

## 2022-07-21 PROCEDURE — 88311 DECALCIFY TISSUE: CPT | Mod: 26

## 2022-07-21 PROCEDURE — 27130 TOTAL HIP ARTHROPLASTY: CPT | Mod: 79,LT

## 2022-07-21 DEVICE — BONE WAX 2.5GM: Type: IMPLANTABLE DEVICE | Site: LEFT | Status: FUNCTIONAL

## 2022-07-21 DEVICE — HEMOCLIP LG ORANGE 6 CARTRIDGE TITANIUM: Type: IMPLANTABLE DEVICE | Site: LEFT | Status: FUNCTIONAL

## 2022-07-21 DEVICE — LIGATING CLIPS TELEFLEX HORIZON MD/LG: Type: IMPLANTABLE DEVICE | Site: LEFT | Status: FUNCTIONAL

## 2022-07-21 DEVICE — HEAD FEM BIOLOX DELTA 36MM -3.5MM: Type: IMPLANTABLE DEVICE | Site: LEFT | Status: FUNCTIONAL

## 2022-07-21 DEVICE — IMPLANTABLE DEVICE: Type: IMPLANTABLE DEVICE | Site: LEFT | Status: FUNCTIONAL

## 2022-07-21 DEVICE — HEMOCLIP MED BLUE 6 CARTRIDGE TITANIUM: Type: IMPLANTABLE DEVICE | Site: LEFT | Status: FUNCTIONAL

## 2022-07-21 DEVICE — LINER ACET ALTEON XLE NEUT GRP 6 36MM: Type: IMPLANTABLE DEVICE | Site: LEFT | Status: FUNCTIONAL

## 2022-07-21 DEVICE — CUP ACET ALTEON CLUS HOLE POROUS COAT GRP 6 54MM: Type: IMPLANTABLE DEVICE | Site: LEFT | Status: FUNCTIONAL

## 2022-07-21 RX ORDER — OMEPRAZOLE 10 MG/1
1 CAPSULE, DELAYED RELEASE ORAL
Qty: 30 | Refills: 1
Start: 2022-07-21 | End: 2022-09-18

## 2022-07-21 RX ORDER — CEFAZOLIN SODIUM 1 G
2000 VIAL (EA) INJECTION ONCE
Refills: 0 | Status: COMPLETED | OUTPATIENT
Start: 2022-07-21 | End: 2022-07-21

## 2022-07-21 RX ORDER — POLYETHYLENE GLYCOL 3350 17 G/17G
17 POWDER, FOR SOLUTION ORAL
Qty: 0 | Refills: 0 | DISCHARGE
Start: 2022-07-21

## 2022-07-21 RX ORDER — TRANEXAMIC ACID 100 MG/ML
1000 INJECTION, SOLUTION INTRAVENOUS ONCE
Refills: 0 | Status: COMPLETED | OUTPATIENT
Start: 2022-07-21 | End: 2022-07-21

## 2022-07-21 RX ORDER — DEXAMETHASONE 0.5 MG/5ML
8 ELIXIR ORAL ONCE
Refills: 0 | Status: DISCONTINUED | OUTPATIENT
Start: 2022-07-21 | End: 2022-07-21

## 2022-07-21 RX ORDER — MELOXICAM 15 MG/1
15 TABLET ORAL
Refills: 0 | Status: DISCONTINUED | OUTPATIENT
Start: 2022-07-22 | End: 2022-08-04

## 2022-07-21 RX ORDER — SODIUM CHLORIDE 9 MG/ML
500 INJECTION INTRAMUSCULAR; INTRAVENOUS; SUBCUTANEOUS ONCE
Refills: 0 | Status: COMPLETED | OUTPATIENT
Start: 2022-07-21 | End: 2022-07-21

## 2022-07-21 RX ORDER — APREPITANT 80 MG/1
40 CAPSULE ORAL ONCE
Refills: 0 | Status: COMPLETED | OUTPATIENT
Start: 2022-07-21 | End: 2022-07-21

## 2022-07-21 RX ORDER — ATORVASTATIN CALCIUM 80 MG/1
80 TABLET, FILM COATED ORAL AT BEDTIME
Refills: 0 | Status: DISCONTINUED | OUTPATIENT
Start: 2022-07-21 | End: 2022-08-04

## 2022-07-21 RX ORDER — APIXABAN 2.5 MG/1
2.5 TABLET, FILM COATED ORAL EVERY 12 HOURS
Refills: 0 | Status: DISCONTINUED | OUTPATIENT
Start: 2022-07-22 | End: 2022-08-04

## 2022-07-21 RX ORDER — CEFAZOLIN SODIUM 1 G
2000 VIAL (EA) INJECTION EVERY 8 HOURS
Refills: 0 | Status: COMPLETED | OUTPATIENT
Start: 2022-07-21 | End: 2022-07-21

## 2022-07-21 RX ORDER — HYDROMORPHONE HYDROCHLORIDE 2 MG/ML
0.5 INJECTION INTRAMUSCULAR; INTRAVENOUS; SUBCUTANEOUS
Refills: 0 | Status: DISCONTINUED | OUTPATIENT
Start: 2022-07-21 | End: 2022-07-21

## 2022-07-21 RX ORDER — CHLORHEXIDINE GLUCONATE 213 G/1000ML
1 SOLUTION TOPICAL ONCE
Refills: 0 | Status: COMPLETED | OUTPATIENT
Start: 2022-07-21 | End: 2022-07-21

## 2022-07-21 RX ORDER — ACETAMINOPHEN 500 MG
1000 TABLET ORAL ONCE
Refills: 0 | Status: COMPLETED | OUTPATIENT
Start: 2022-07-21 | End: 2022-07-21

## 2022-07-21 RX ORDER — OXYCODONE HYDROCHLORIDE 5 MG/1
1 TABLET ORAL
Qty: 0 | Refills: 0 | DISCHARGE
Start: 2022-07-21

## 2022-07-21 RX ORDER — APIXABAN 2.5 MG/1
1 TABLET, FILM COATED ORAL
Qty: 56 | Refills: 0
Start: 2022-07-21 | End: 2022-08-17

## 2022-07-21 RX ORDER — SENNA PLUS 8.6 MG/1
2 TABLET ORAL
Qty: 0 | Refills: 0 | DISCHARGE
Start: 2022-07-21

## 2022-07-21 RX ORDER — DEXAMETHASONE 0.5 MG/5ML
8 ELIXIR ORAL ONCE
Refills: 0 | Status: COMPLETED | OUTPATIENT
Start: 2022-07-22 | End: 2022-07-22

## 2022-07-21 RX ORDER — MELOXICAM 15 MG/1
1 TABLET ORAL
Qty: 30 | Refills: 0
Start: 2022-07-21 | End: 2022-08-19

## 2022-07-21 RX ORDER — ACETAMINOPHEN 500 MG
2 TABLET ORAL
Qty: 0 | Refills: 0 | DISCHARGE
Start: 2022-07-21

## 2022-07-21 RX ORDER — ONDANSETRON 8 MG/1
4 TABLET, FILM COATED ORAL ONCE
Refills: 0 | Status: DISCONTINUED | OUTPATIENT
Start: 2022-07-21 | End: 2022-07-21

## 2022-07-21 RX ORDER — ROSUVASTATIN CALCIUM 5 MG/1
1 TABLET ORAL
Qty: 0 | Refills: 0 | DISCHARGE

## 2022-07-21 RX ORDER — DICLOFENAC SODIUM 75 MG/1
1 TABLET, DELAYED RELEASE ORAL
Qty: 0 | Refills: 0 | DISCHARGE

## 2022-07-21 RX ORDER — SODIUM CHLORIDE 9 MG/ML
1000 INJECTION, SOLUTION INTRAVENOUS
Refills: 0 | Status: DISCONTINUED | OUTPATIENT
Start: 2022-07-21 | End: 2022-07-21

## 2022-07-21 RX ADMIN — Medication 400 MILLIGRAM(S): at 15:08

## 2022-07-21 RX ADMIN — SODIUM CHLORIDE 125 MILLILITER(S): 9 INJECTION, SOLUTION INTRAVENOUS at 14:57

## 2022-07-21 RX ADMIN — SODIUM CHLORIDE 500 MILLILITER(S): 9 INJECTION INTRAMUSCULAR; INTRAVENOUS; SUBCUTANEOUS at 14:57

## 2022-07-21 RX ADMIN — Medication 1000 MILLIGRAM(S): at 15:12

## 2022-07-21 RX ADMIN — APREPITANT 40 MILLIGRAM(S): 80 CAPSULE ORAL at 06:46

## 2022-07-21 RX ADMIN — Medication 100 MILLIGRAM(S): at 16:00

## 2022-07-21 RX ADMIN — Medication 100 MILLIGRAM(S): at 23:51

## 2022-07-21 RX ADMIN — SODIUM CHLORIDE 500 MILLILITER(S): 9 INJECTION INTRAMUSCULAR; INTRAVENOUS; SUBCUTANEOUS at 10:57

## 2022-07-21 RX ADMIN — Medication 1000 MILLIGRAM(S): at 21:54

## 2022-07-21 RX ADMIN — SODIUM CHLORIDE 125 MILLILITER(S): 9 INJECTION, SOLUTION INTRAVENOUS at 21:55

## 2022-07-21 RX ADMIN — SODIUM CHLORIDE 75 MILLILITER(S): 9 INJECTION, SOLUTION INTRAVENOUS at 10:48

## 2022-07-21 RX ADMIN — CHLORHEXIDINE GLUCONATE 1 APPLICATION(S): 213 SOLUTION TOPICAL at 06:47

## 2022-07-21 RX ADMIN — Medication 1000 MILLIGRAM(S): at 21:53

## 2022-07-21 NOTE — DISCHARGE NOTE PROVIDER - PROVIDER TOKENS
PROVIDER:[TOKEN:[[id number]],SCHEDULEDAPPT:[08/03/2022],SCHEDULEDAPPTTIME:[08:30 AM],ESTABLISHEDPATIENT:[T]]

## 2022-07-21 NOTE — DISCHARGE NOTE PROVIDER - NSDCCPCAREPLAN_GEN_ALL_CORE_FT
PRINCIPAL DISCHARGE DIAGNOSIS  Diagnosis: Primary osteoarthritis of left hip  Assessment and Plan of Treatment: left anterior MAK  Physical Therapy/Occupational Therapy for: Ambulation, transfers, stairs, & ADLs, isometrics.   Full weight bearing on both legs; Walker/cane use as instructed by Physical therapy/Occupational therapy. Anterior Total Hip Replacement precautions for  6 weeks: No straight leg raise; No external rotation of hip when extended-standing or lying flat; No hyperextension of hip when standing (kickback).   Ice packs to hip for 30 min. every 3 hours and after physical therapy.   Keep incision clean and dry. You have a silverlon dressing that may get slightly wet in the shower.  Remove dressing in 7 days.  Prineo was used for closure and may also get slightly wet in the shower as long as there is no drainage from wound.  Prineo tape removal on/near after Post Op Day # 14 in Surgeon's office.  • Do not take a tub bath yet.   • Do not resume driving until you have your surgeon’s permission.

## 2022-07-21 NOTE — DISCHARGE NOTE PROVIDER - NSDCFUSCHEDAPPT_GEN_ALL_CORE_FT
Timoteo Laboy  [facility]mits.  Scheduled Appointment: 07/30/2022    Timoteo Laboy  [facility]  ORTHOSUR[address]  Scheduled Appointment: 08/03/2022

## 2022-07-21 NOTE — DISCHARGE NOTE PROVIDER - CARE PROVIDER_API CALL
GABRIEL Laboy (MD)  Orthopaedic Surgery  [address], [address]  [place] [address]  Phone: [phone]  Fax: [phone]  Established Patient  Scheduled Appointment: 08/03/2022 08:30 AM

## 2022-07-21 NOTE — BRIEF OPERATIVE NOTE - COMMENTS
implants : acet 54mm alteon cluster cup w/ 0 degree xle vit E liner, femur #6 standard offset collarless alteon HA stem w/ 36-3.5 biolox head

## 2022-07-21 NOTE — OCCUPATIONAL THERAPY INITIAL EVALUATION ADULT - NS ASR FOLLOW COMMAND OT EVAL
Patient educated regarding fall prevention and home/hospital safety. Pt educated on use of AE/DME recommended for safety & the need to call for assistance. Patient with good understanding. Role of OT and patient goals discussed. Patient educated regarding diagnosis specific precautions and provided with educational folder including goals, expectations and ther ex. Pt educated on role and goal of OT. Discharge planning and recommendations reviewed with the patient. Case management/ Social work notified ./100% of the time/unable to follow multi-step instructions

## 2022-07-21 NOTE — DISCHARGE NOTE PROVIDER - HOSPITAL COURSE
The patient is a 62 y.o. male with severe osteoarthritis of the left hip.  He was evaluated by the PST dept at [facility] and obtained the appropriate medical clearances.  He received preop radiation therapy for heterotopic ossification prophylaxis the day before surgery.  After admission on 7/21/22 and receiving pre-operative parenteral prophylactic antibiotics (ancef), the patient  underwent an  uncomplicated left anterior MAK by orthopedic surgeon Dr. Timoteo Laboy.    A medical consultation from the Hospitalist service was obtained for post-operative medical co-management. Typical Physical & occupational therapy modalities post anterior MAK were performed including ambulation training, range of motion, ADL's, and transfers. Eliquis 2.5 mg every 12 hrs, along w/ bilat venodynes, was given for DVT prophylaxis (caprini 11).  The patient had a clean appearing surgical incision with no sign of surgical site infections and had a stable neuro / vascular exam of the operated extremity.  After progression of mobility guided by the PT/ OT staff,  the patient was felt to benefit from further rehabilitative care for restoration to level of function. This was felt to best be accomplished at  home.  Discharge and Orthopedic Care instructions were delineated in the Discharge Plan and reviewed with the patient. All medications were delineated in the medication reconciliation tool and key points were reviewed with the patient. They were deemed stable from an Orthopedic & medical standpoint for discharge today.  Upon completion of Home care he will be  following up with Dr. Laboy for office  follow up Orthopedic care.

## 2022-07-21 NOTE — PHYSICAL THERAPY INITIAL EVALUATION ADULT - PRECAUTIONS/LIMITATIONS, REHAB EVAL
Anterior hip precautions:No hyperextension,no extreme external rotation, no extreme abduction./left hip precautions/surgical precautions

## 2022-07-21 NOTE — PHYSICAL THERAPY INITIAL EVALUATION ADULT - ACTIVE RANGE OF MOTION EXAMINATION, REHAB EVAL
Left LE ankle foot WNL, left knee WNL, left hip decreased due to pain  at sx site/quique. upper extremity Active ROM was WNL (within normal limits)/RLE Active ROM was WNL (within normal limits)/deficits as listed below

## 2022-07-21 NOTE — DISCHARGE NOTE PROVIDER - NSDCMRMEDTOKEN_GEN_ALL_CORE_FT
acetaminophen 500 mg oral tablet: 2 tab(s) orally once a day  diclofenac sodium 75 mg oral delayed release tablet: 1 tab(s) orally once a day  Moisture Eyes ophthalmic solution: 1 drop(s) to each affected eye once a day  rosuvastatin 20 mg oral tablet: 1 tab(s) orally once a day (in the afternoon)   acetaminophen 500 mg oral tablet: 2 tab(s) orally every 8 hours  apixaban 2.5 mg oral tablet: 1 tab(s) orally every 12 hours  meloxicam 15 mg oral tablet: 1 tab(s) orally once a day   Moisture Eyes ophthalmic solution: 1 drop(s) to each affected eye once a day  omeprazole 20 mg oral delayed release capsule: 1 cap(s) orally once a day  before breakfast   oxyCODONE 5 mg oral tablet: 1 tab(s) orally every 3 hours, As needed, Moderate Pain (4 - 6)  polyethylene glycol 3350 oral powder for reconstitution: 17 gram(s) orally once a day (at bedtime)  rosuvastatin 20 mg oral tablet: 1 tab(s) orally once a day (in the afternoon)  senna leaf extract oral tablet: 2 tab(s) orally once a day (at bedtime)

## 2022-07-21 NOTE — PHYSICAL THERAPY INITIAL EVALUATION ADULT - MD ORDER
WBAT left LE. OOb. PT evaluate and treat. Anterior hip precautions:No hyperextension,no extreme external rotation, no extreme abduction.

## 2022-07-21 NOTE — OCCUPATIONAL THERAPY INITIAL EVALUATION ADULT - ADDITIONAL COMMENTS
Lives with his wife and daughter. 3 step to enter. 12 inside with handrail. Tub. Has Rw, commode and cane

## 2022-07-22 ENCOUNTER — TRANSCRIPTION ENCOUNTER (OUTPATIENT)
Age: 62
End: 2022-07-22

## 2022-07-22 VITALS
SYSTOLIC BLOOD PRESSURE: 144 MMHG | DIASTOLIC BLOOD PRESSURE: 72 MMHG | TEMPERATURE: 98 F | OXYGEN SATURATION: 97 % | RESPIRATION RATE: 20 BRPM | HEART RATE: 76 BPM

## 2022-07-22 LAB
ANION GAP SERPL CALC-SCNC: 6 MMOL/L — SIGNIFICANT CHANGE UP (ref 5–17)
BUN SERPL-MCNC: 12 MG/DL — SIGNIFICANT CHANGE UP (ref 7–23)
CALCIUM SERPL-MCNC: 8.7 MG/DL — SIGNIFICANT CHANGE UP (ref 8.4–10.5)
CHLORIDE SERPL-SCNC: 107 MMOL/L — SIGNIFICANT CHANGE UP (ref 96–108)
CO2 SERPL-SCNC: 27 MMOL/L — SIGNIFICANT CHANGE UP (ref 22–31)
CREAT SERPL-MCNC: 0.74 MG/DL — SIGNIFICANT CHANGE UP (ref 0.5–1.3)
EGFR: 101 ML/MIN/1.73M2 — SIGNIFICANT CHANGE UP
GLUCOSE SERPL-MCNC: 120 MG/DL — HIGH (ref 70–99)
HCT VFR BLD CALC: 36.2 % — LOW (ref 39–50)
HGB BLD-MCNC: 11.4 G/DL — LOW (ref 13–17)
MCHC RBC-ENTMCNC: 26.7 PG — LOW (ref 27–34)
MCHC RBC-ENTMCNC: 31.5 GM/DL — LOW (ref 32–36)
MCV RBC AUTO: 84.8 FL — SIGNIFICANT CHANGE UP (ref 80–100)
NRBC # BLD: 0 /100 WBCS — SIGNIFICANT CHANGE UP (ref 0–0)
PLATELET # BLD AUTO: 142 K/UL — LOW (ref 150–400)
POTASSIUM SERPL-MCNC: 4.5 MMOL/L — SIGNIFICANT CHANGE UP (ref 3.5–5.3)
POTASSIUM SERPL-SCNC: 4.5 MMOL/L — SIGNIFICANT CHANGE UP (ref 3.5–5.3)
RBC # BLD: 4.27 M/UL — SIGNIFICANT CHANGE UP (ref 4.2–5.8)
RBC # FLD: 13.6 % — SIGNIFICANT CHANGE UP (ref 10.3–14.5)
SODIUM SERPL-SCNC: 140 MMOL/L — SIGNIFICANT CHANGE UP (ref 135–145)
WBC # BLD: 13.18 K/UL — HIGH (ref 3.8–10.5)
WBC # FLD AUTO: 13.18 K/UL — HIGH (ref 3.8–10.5)

## 2022-07-22 PROCEDURE — 97530 THERAPEUTIC ACTIVITIES: CPT

## 2022-07-22 PROCEDURE — 97161 PT EVAL LOW COMPLEX 20 MIN: CPT

## 2022-07-22 PROCEDURE — 97116 GAIT TRAINING THERAPY: CPT

## 2022-07-22 PROCEDURE — 97535 SELF CARE MNGMENT TRAINING: CPT

## 2022-07-22 PROCEDURE — 97165 OT EVAL LOW COMPLEX 30 MIN: CPT

## 2022-07-22 PROCEDURE — 36415 COLL VENOUS BLD VENIPUNCTURE: CPT

## 2022-07-22 PROCEDURE — 27130 TOTAL HIP ARTHROPLASTY: CPT | Mod: LT

## 2022-07-22 PROCEDURE — 76000 FLUOROSCOPY <1 HR PHYS/QHP: CPT

## 2022-07-22 PROCEDURE — 97110 THERAPEUTIC EXERCISES: CPT

## 2022-07-22 PROCEDURE — 88311 DECALCIFY TISSUE: CPT

## 2022-07-22 PROCEDURE — C1889: CPT

## 2022-07-22 PROCEDURE — 80048 BASIC METABOLIC PNL TOTAL CA: CPT

## 2022-07-22 PROCEDURE — 85027 COMPLETE CBC AUTOMATED: CPT

## 2022-07-22 PROCEDURE — 99213 OFFICE O/P EST LOW 20 MIN: CPT

## 2022-07-22 PROCEDURE — 88305 TISSUE EXAM BY PATHOLOGIST: CPT

## 2022-07-22 PROCEDURE — C1776: CPT

## 2022-07-22 RX ADMIN — Medication 101.6 MILLIGRAM(S): at 06:05

## 2022-07-22 RX ADMIN — MELOXICAM 15 MILLIGRAM(S): 15 TABLET ORAL at 08:57

## 2022-07-22 RX ADMIN — Medication 1000 MILLIGRAM(S): at 05:54

## 2022-07-22 RX ADMIN — Medication 1000 MILLIGRAM(S): at 06:04

## 2022-07-22 RX ADMIN — MELOXICAM 15 MILLIGRAM(S): 15 TABLET ORAL at 09:00

## 2022-07-22 RX ADMIN — Medication 1000 MILLIGRAM(S): at 14:00

## 2022-07-22 RX ADMIN — SODIUM CHLORIDE 125 MILLILITER(S): 9 INJECTION, SOLUTION INTRAVENOUS at 05:54

## 2022-07-22 RX ADMIN — Medication 1000 MILLIGRAM(S): at 13:41

## 2022-07-22 RX ADMIN — PANTOPRAZOLE SODIUM 40 MILLIGRAM(S): 20 TABLET, DELAYED RELEASE ORAL at 05:54

## 2022-07-22 RX ADMIN — APIXABAN 2.5 MILLIGRAM(S): 2.5 TABLET, FILM COATED ORAL at 08:58

## 2022-07-22 NOTE — PROGRESS NOTE ADULT - ASSESSMENT
61 yo M s/p L THR    #Post op  Encourage ambulation  PT/OT  Incentive spirometry  Labs reviewed  Vitals reviewed   Mild anemia post op likely due to acute blood loss  Mild leukocytosis post op  DVT ppx  Patient is medically stable for discharge with close outpatient follow up once cleared by PT and ortho

## 2022-07-22 NOTE — DISCHARGE NOTE NURSING/CASE MANAGEMENT/SOCIAL WORK - NSSCNAMETXT_GEN_ALL_CORE
[facility] [place] - [phone] or [phone]  Nurse to visit the day after hospital discharge; physical therapist to follow. Please contact the home care agency if you have not heard from them by 12 noon on the day after your hospital discharge.

## 2022-07-22 NOTE — DISCHARGE NOTE NURSING/CASE MANAGEMENT/SOCIAL WORK - PATIENT PORTAL LINK FT
You can access the FollowMyHealth Patient Portal offered by [facility] by registering at the following website: [email or web address]. By joining [facility]’s FollowMyHealth portal, you will also be able to view your health information using other applications (apps) compatible with our system.

## 2022-07-22 NOTE — PHARMACOTHERAPY INTERVENTION NOTE - COMMENTS
Transition of Care video discharge education - medication calendar given to patient Pharmacy fill confirmed. 
Admission medication reconciliation POD1

## 2022-07-22 NOTE — PROGRESS NOTE ADULT - SUBJECTIVE AND OBJECTIVE BOX
RIC REYNA [id number]    Pt is a 62y year old Male s/p left THR.  Denies chest pain/shortness of breath/nausea/vomitting.     T(C): 36.6 (07-21-22 @ 13:30), Max: 37.2 (07-21-22 @ 10:14)  HR: 76 (07-21-22 @ 14:00) (67 - 80)  BP: 130/58 (07-21-22 @ 14:00) (122/62 - 142/64)  RR: 16 (07-21-22 @ 14:00) (12 - 18)  SpO2: 100% (07-21-22 @ 14:00) (97% - 100%)  Wt(kg): --      07-21 @ 07:01  -  07-21 @ 14:06  --------------------------------------------------------  IN: 2395 mL / OUT: 675 mL / NET: 1720 mL        PE:   Left LE: Dressing CDI, SILT distally, EHL/FHL intact. HV in place, PAS on.    A: 62y year old Male s/p left THR POD#0    Plan:   -DVT ppx= Eliquis  -PT/OT = OOB wbat  -Hip dislocation precautions  -Pain control   -Medicine to follow   -continue antibiotics as per SCIP protocol  -Follow up Labs  -Incentive spirometry, continue use of PAS
Discharge medication calendar:  [Preop RT]  Eliquis 2.5mg q12h x 4 weeks  APAP 1000mg q8h x 2-3 weeks  Meloxicam 15mg QAM x 2-3 weeks  Omeprazole 20mg QAM x 4 weeks  Narcotic PRN  Docusate 100mg TID while taking narcotic  Miralax, Senna, or Bisacodyl PRN for treatment of constipation  
Patient is a 62y old  Male who presents with a chief complaint of left hip pain--for left anterior MAK (22 Jul 2022 08:26)      INTERVAL HPI/OVERNIGHT EVENTS: Patient seen and examined at bedside. No overnight events    MEDICATIONS  (STANDING):  acetaminophen     Tablet .. 1000 milliGRAM(s) Oral every 8 hours  apixaban 2.5 milliGRAM(s) Oral every 12 hours  artificial tears (preservative free) Ophthalmic Solution 1 Drop(s) Both EYES daily  atorvastatin 80 milliGRAM(s) Oral at bedtime  lactated ringers. 1000 milliLiter(s) (125 mL/Hr) IV Continuous <Continuous>  meloxicam 15 milliGRAM(s) Oral <User Schedule>  pantoprazole    Tablet 40 milliGRAM(s) Oral before breakfast  polyethylene glycol 3350 17 Gram(s) Oral at bedtime  senna 2 Tablet(s) Oral at bedtime    MEDICATIONS  (PRN):  HYDROmorphone  Injectable 0.5 milliGRAM(s) IV Push every 3 hours PRN breakthrough pain  magnesium hydroxide Suspension 30 milliLiter(s) Oral daily PRN Constipation  ondansetron Injectable 4 milliGRAM(s) IV Push every 6 hours PRN Nausea and/or Vomiting  oxyCODONE    IR 5 milliGRAM(s) Oral every 3 hours PRN Moderate Pain (4 - 6)  oxyCODONE    IR 10 milliGRAM(s) Oral every 3 hours PRN Severe Pain (7 - 10)      Allergies    artichokes - itching in throat (Other)  No Known Drug Allergies  Pistachios (Other)    Intolerances        REVIEW OF SYSTEMS:  CONSTITUTIONAL: No fever or chills  HEENT:  No headache, no sore throat  RESPIRATORY: No cough, wheezing, or shortness of breath  CARDIOVASCULAR: No chest pain, palpitations, or leg swelling  GASTROINTESTINAL: No abd pain, nausea, vomiting, or diarrhea  GENITOURINARY: No dysuria, frequency, or hematuria  NEUROLOGICAL: no focal weakness or dizziness  MUSCULOSKELETAL: no myalgias     Vital Signs Last 24 Hrs  T(C): 36.5 (22 Jul 2022 08:07), Max: 36.9 (21 Jul 2022 19:12)  T(F): 97.7 (22 Jul 2022 08:07), Max: 98.5 (21 Jul 2022 23:16)  HR: 76 (22 Jul 2022 08:07) (59 - 85)  BP: 144/72 (22 Jul 2022 08:07) (108/61 - 144/72)  BP(mean): --  RR: 20 (22 Jul 2022 08:07) (14 - 20)  SpO2: 97% (22 Jul 2022 08:07) (95% - 100%)    Parameters below as of 22 Jul 2022 08:07  Patient On (Oxygen Delivery Method): room air        PHYSICAL EXAM:  GENERAL: NAD  HEENT:  NCAT, moist mucous membranes  CHEST/LUNG:  CTA b/l, no rales, wheezes, or rhonchi  HEART:  RRR, S1, S2  ABDOMEN:  BS+, soft, nontender, nondistended  EXTREMITIES: no edema, cyanosis, or calf tenderness  NERVOUS SYSTEM: AA&Ox3, sensation intact    LABS:                        11.4   13.18 )-----------( 142      ( 22 Jul 2022 06:00 )             36.2     CBC Full  -  ( 22 Jul 2022 06:00 )  WBC Count : 13.18 K/uL  Hemoglobin : 11.4 g/dL  Hematocrit : 36.2 %  Platelet Count - Automated : 142 K/uL  Mean Cell Volume : 84.8 fl  Mean Cell Hemoglobin : 26.7 pg  Mean Cell Hemoglobin Concentration : 31.5 gm/dL  Auto Neutrophil # : x  Auto Lymphocyte # : x  Auto Monocyte # : x  Auto Eosinophil # : x  Auto Basophil # : x  Auto Neutrophil % : x  Auto Lymphocyte % : x  Auto Monocyte % : x  Auto Eosinophil % : x  Auto Basophil % : x    22 Jul 2022 06:00    140    |  107    |  12     ----------------------------<  120    4.5     |  27     |  0.74     Ca    8.7        22 Jul 2022 06:00          CAPILLARY BLOOD GLUCOSE              RADIOLOGY & ADDITIONAL TESTS:    Consultant(s) Notes Reviewed:  [x] YES  [ ] NO    Care Discussed with [x] Consultants  [x] Patient  [ ] Family  [ ]      [ x] Other; RN  DVT ppx  
Procedure Left Anterior THR  POD#: 1    S: Pt without complaints. No SOB,CP, N/V. Tolerated Fluids / Diet well.   Pain comfortable on Tylenol . No BM yet  + flatus, No abdominal pain.  No oxy used as yet  Pain Rx:   acetaminophen     Tablet .. 1000 milliGRAM(s) Oral every 8 hours  HYDROmorphone  Injectable 0.5 milliGRAM(s) IV Push every 3 hours PRN  meloxicam 15 milliGRAM(s) Oral <User Schedule>  ondansetron Injectable 4 milliGRAM(s) IV Push every 6 hours PRN  oxyCODONE    IR 5 milliGRAM(s) Oral every 3 hours PRN  oxyCODONE    IR 10 milliGRAM(s) Oral every 3 hours PRN    O: General: Pt Alert and oriented, On exam NAD,   VS: Vital Signs Last 24 Hrs  T(C): 36.5 (22 Jul 2022 08:07), Max: 37.2 (21 Jul 2022 10:14)  T(F): 97.7 (22 Jul 2022 08:07), Max: 98.9 (21 Jul 2022 10:14)  HR: 76 (22 Jul 2022 08:07) (59 - 85)  BP: 144/72 (22 Jul 2022 08:07) (108/61 - 144/72)  BP(mean): --  RR: 20 (22 Jul 2022 08:07) (12 - 20)  SpO2: 97% (22 Jul 2022 08:07) (95% - 100%)    Parameters below as of 22 Jul 2022 08:07  Patient On (Oxygen Delivery Method): room air      Heart: RRR.  No murmur  Lungs: BS clear bilat.  Abdomen: Soft; no distention, benign exam    Ext: Left Ant. Hip: silverlon clean and dry.  Hemovac removed--pt tolerated well.   Neurologic: Has sensation bilat. feet & toes ;  Full AROM bilat feet & toes. EHL / AT  = Bilat: 5/5 ; Sensation Present mid lateral thigh  Vascular: Feet toes warm, pink. DP = 2+. Calves soft ; w/o tenderness bilat..  VTEP: On Bilat. Venodynes +   apixaban 2.5 milliGRAM(s) Oral every 12 hours    H.O Prophylaxis: preop radiation  Activity in PT Noted.  Walked 100'                          11.4   13.18 )-----------( 142      ( 22 Jul 2022 06:00 )             36.2     07-21    136  |  100  |  12  ----------------------------<  242<H>  4.1   |  27  |  1.15    Ca    8.4      21 Jul 2022 16:00        Hospitalist input noted    Primary Orthopedic Assessment:  • Stable from Orthopedic perspective  • Neuro motor exam stable:   • Labs: stable      Plan:   • Continue:  PT/OT/Weightbearing as tolerated with assistance of a walker/Anterior THR precautions/Ice to hip/          Incentive spirometry encouraged  • Continue DVT prophylaxis as prescribed, including use of compression devices and ankle pumps  • Continue Pain Rx  • Anterior hip precautions reviewed with patient  • Plans per Medicine   • Discharge planning – anticipated discharge is Home D/C with home care & home PT when medically stable & cleared by PT/OT--today

## 2022-07-22 NOTE — DISCHARGE NOTE NURSING/CASE MANAGEMENT/SOCIAL WORK - NSDCPEFALRISK_GEN_ALL_CORE
For information on Fall & Injury Prevention, visit: [email or web address] OR  [email or web address] OR  https://www.cdc.gov/steadi/patient.html

## 2022-08-03 ENCOUNTER — APPOINTMENT (OUTPATIENT)
Dept: ORTHOPEDIC SURGERY | Facility: CLINIC | Age: 62
End: 2022-08-03

## 2022-08-03 VITALS — BODY MASS INDEX: 29.98 KG/M2 | WEIGHT: 191 LBS | HEIGHT: 67 IN

## 2022-08-03 PROCEDURE — 72170 X-RAY EXAM OF PELVIS: CPT | Mod: LT

## 2022-08-03 PROCEDURE — 73502 X-RAY EXAM HIP UNI 2-3 VIEWS: CPT

## 2022-08-03 PROCEDURE — 99024 POSTOP FOLLOW-UP VISIT: CPT

## 2022-08-03 NOTE — HISTORY OF PRESENT ILLNESS
[de-identified] : Left total hip replacement July 21, 2022 [de-identified] : Denies fever chills.  Reports left hip is pain and symptom-free.  Ambulating with a cane [de-identified] : Well-nourished no distress\par Left hip mild Trendelenburg leg lengths equal passive motion satisfactory pain-free neurovascular intact incision healed [de-identified] : X-ray AP pelvis left hip AP lateral demonstrate satisfactory alignment left total hip components [de-identified] : Left total hip replacement [de-identified] : Skin tape removal, physical therapy, follow-up 1 month

## 2022-08-03 NOTE — HISTORY OF PRESENT ILLNESS
[de-identified] : Left total hip replacement July 21, 2022 [de-identified] : Denies fever chills.  Reports left hip is pain and symptom-free.  Ambulating with a cane [de-identified] : Well-nourished no distress\par Left hip mild Trendelenburg leg lengths equal passive motion satisfactory pain-free neurovascular intact incision healed [de-identified] : X-ray AP pelvis left hip AP lateral demonstrate satisfactory alignment left total hip components [de-identified] : Left total hip replacement [de-identified] : Skin tape removal, physical therapy, follow-up 1 month

## 2022-09-07 ENCOUNTER — APPOINTMENT (OUTPATIENT)
Dept: ORTHOPEDIC SURGERY | Facility: CLINIC | Age: 62
End: 2022-09-07

## 2022-09-07 PROCEDURE — 99024 POSTOP FOLLOW-UP VISIT: CPT

## 2022-09-07 PROCEDURE — 73502 X-RAY EXAM HIP UNI 2-3 VIEWS: CPT

## 2022-09-07 PROCEDURE — 72170 X-RAY EXAM OF PELVIS: CPT | Mod: LT,59

## 2022-09-07 NOTE — HISTORY OF PRESENT ILLNESS
[de-identified] : DOS: 07/21/2022\par Procedure: Left total Hip Arthroplasty [de-identified] : Patient presents for postop after a left total hip arthroplasty. Overall, the patient is doing well and notes improvement in his postoperative pain. He has been working on home exercises. The patient denies fevers, chills, shortness of breath, chest pain and calf pain.With certain movements patient experiences sharp anterior thigh pain [de-identified] : Well-nourished no distress left hip satisfactory gait incision healed no local tenderness passive rotation provokes anterior thigh pain [de-identified] : X-ray of the pelvis left hip reveals no interval change in left total hip component alignment and no fracture [de-identified] : Left total hip replacement, rule out occult fracture\par  [de-identified] : CAT scan left hip

## 2022-09-16 ENCOUNTER — APPOINTMENT (OUTPATIENT)
Dept: CT IMAGING | Facility: CLINIC | Age: 62
End: 2022-09-16

## 2022-09-16 ENCOUNTER — OUTPATIENT (OUTPATIENT)
Dept: OUTPATIENT SERVICES | Facility: HOSPITAL | Age: 62
LOS: 1 days | End: 2022-09-16
Payer: COMMERCIAL

## 2022-09-16 DIAGNOSIS — K08.409 PARTIAL LOSS OF TEETH, UNSPECIFIED CAUSE, UNSPECIFIED CLASS: Chronic | ICD-10-CM

## 2022-09-16 DIAGNOSIS — Z96.641 PRESENCE OF RIGHT ARTIFICIAL HIP JOINT: Chronic | ICD-10-CM

## 2022-09-16 DIAGNOSIS — Z98.890 OTHER SPECIFIED POSTPROCEDURAL STATES: Chronic | ICD-10-CM

## 2022-09-16 DIAGNOSIS — Z96.642 PRESENCE OF LEFT ARTIFICIAL HIP JOINT: ICD-10-CM

## 2022-09-16 PROCEDURE — 73700 CT LOWER EXTREMITY W/O DYE: CPT | Mod: 26,LT

## 2022-09-16 PROCEDURE — 73700 CT LOWER EXTREMITY W/O DYE: CPT

## 2022-09-27 PROBLEM — M16.12 PRIMARY OSTEOARTHRITIS OF LEFT HIP: Status: ACTIVE | Noted: 2022-07-13

## 2022-09-27 PROBLEM — Z96.642 STATUS POST TOTAL REPLACEMENT OF LEFT HIP: Status: ACTIVE | Noted: 2022-09-27

## 2022-09-28 ENCOUNTER — APPOINTMENT (OUTPATIENT)
Dept: ORTHOPEDIC SURGERY | Facility: CLINIC | Age: 62
End: 2022-09-28

## 2022-09-28 DIAGNOSIS — Z96.642 PRESENCE OF LEFT ARTIFICIAL HIP JOINT: ICD-10-CM

## 2022-09-28 DIAGNOSIS — M16.12 UNILATERAL PRIMARY OSTEOARTHRITIS, LEFT HIP: ICD-10-CM

## 2022-09-28 PROCEDURE — 99024 POSTOP FOLLOW-UP VISIT: CPT

## 2022-09-28 NOTE — HISTORY OF PRESENT ILLNESS
[de-identified] : DOS: 07/21/2022\par Procedure: Left Total Hip Arthroplasty [de-identified] : Patient presents today for third postop after a left total hip arthroplasty. Overall, he is doing well and notes improved postoperative pain, however, still endorses sharp anterior thigh pain with particular movements. He presents today to discuss the results of his recent CT scan of the left hip. 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 There subchondral cysts along the anterior superior left acetabulum which appear unchanged when compared to ortho PACs radiographs from 8/3/2022 as well as preoperative images on 7/13/2022 representing subchondral cystic changes.\par \par Status post right total hip arthroplasty with single acetabular screw without evidence of loosening or periprosthetic fracture.\par \par Facet arthrosis of the lower lumbar spine.\par \par Vascular calcifications are present. Fat-containing left inguinal hernia.\par \par IMPRESSION:\par Status post bilateral total hip arthroplasties. No evidence of fracture or acute complication.\par \par    [de-identified] : Impression left total hip replacement possible impingement possible myofascial syndrome\par Plan discontinue physical therapy follow-up 3 weeks

## 2022-10-19 ENCOUNTER — APPOINTMENT (OUTPATIENT)
Dept: ORTHOPEDIC SURGERY | Facility: CLINIC | Age: 62
End: 2022-10-19

## 2022-10-19 VITALS — BODY MASS INDEX: 30.61 KG/M2 | WEIGHT: 195 LBS | HEIGHT: 67 IN

## 2022-10-19 PROCEDURE — 99024 POSTOP FOLLOW-UP VISIT: CPT

## 2022-10-19 NOTE — HISTORY OF PRESENT ILLNESS
[de-identified] : DOS: 07/21/2022\par Procedure: Left Total Hip Arthroplasty [de-identified] : Well-nourished no distress left hip Satisfactory gait leg lengths equal passive motion mildly restricted [de-identified] : Patient reports left hip is "fine when walking going up and down stairs and using elliptical.  On a day-to-day basis he is symptom-free denies groin or thigh pain.  He does experience brief proximal thigh discomfort with certain rotation movements [de-identified] : CT Hip No Cont, Left \par \par PROCEDURE DATE:  09/16/2022\par \par INTERPRETATION:  Exam Type: CT HIP LEFT\par Exam Date and Time: 9/16/2022 1:56 PM\par Indication: Prosthetic hip with concern for periprosthetic fracture\par Comparison: Ortho PACS pelvis radiograph on 8/3/2022\par 7/13/2022\par \par TECHNIQUE:\par Multiplanar CT images of the left hip were obtained without contrast.\par \par FINDINGS:\par \par Status post left total hip arthroplasty without evidence periprosthetic fracture. There subchondral cysts along the anterior superior left acetabulum which appear unchanged when compared to ortho PACs radiographs from 8/3/2022 as well as preoperative images on 7/13/2022 representing subchondral cystic changes.\par \par Status post right total hip arthroplasty with single acetabular screw without evidence of loosening or periprosthetic fracture.\par \par Facet arthrosis of the lower lumbar spine.\par \par Vascular calcifications are present. Fat-containing left inguinal hernia.\par \par IMPRESSION:\par Status post bilateral total hip arthroplasties. No evidence of fracture or acute complication.\par \par    [de-identified] : Impression left total hip replacement possible impingement possible myofascial syndrome\par  [de-identified] : Plan Symptomatic treatment follow-up 1 to 2 months

## 2022-12-28 ENCOUNTER — APPOINTMENT (OUTPATIENT)
Dept: ORTHOPEDIC SURGERY | Facility: CLINIC | Age: 62
End: 2022-12-28

## 2022-12-28 VITALS — WEIGHT: 200 LBS | BODY MASS INDEX: 31.39 KG/M2 | HEIGHT: 67 IN

## 2022-12-28 DIAGNOSIS — Z96.642 PRESENCE OF LEFT ARTIFICIAL HIP JOINT: ICD-10-CM

## 2022-12-28 PROCEDURE — 99213 OFFICE O/P EST LOW 20 MIN: CPT

## 2022-12-28 NOTE — PHYSICAL EXAM
[de-identified] : Constitutional:Well nourished , well developed and in no acute distress\par Psychiatric: Alert and oriented to time place and person.Appropriate affect \par Skin:Head, neck, arms and lower extremities:no lesions or discoloration\par HEENT: Normocephalic, EOM intact, Nasal septum midline,\par Respiratory: Unlabored respirations,no audible wheezing ,no tachypnea, no cyanosis\par Cardiovascular: no leg swelling  no ankle edema no JVD, pulse regular\par Vascular: no calf or thigh tenderness, \par Peripheral pulses; intact\par Lymphatics:No groin adenopathy,no lymphedema lower  or upper extremities\par Left hip passive range of motion Pain-free.  Leg lengths equal.  Satisfactory gait

## 2022-12-28 NOTE — HISTORY OF PRESENT ILLNESS
[de-identified] : Zhang is a 62 year old male who presents for follow up of anterior left total hip replacement DOS: 07/21/2022. He states he has been done with physical therapy and has been doing all of his own activities with no complaints. Denies groin or thigh pain.He is ambulating independently

## 2023-09-21 ASSESSMENT — HOOS JR
WALKING ON UNEVEN SURFACE: MODERATE
RISING FROM SITTING: MODERATE
IMPORTED LATERALITY: LEFT
SITTING: MILD
GOING UP OR DOWN STAIRS: SEVERE
IMPORTED HOOS JR SCORE: 11.0
HOOS JR RAW SCORE: 11
BENDING TO THE FLOOR TO PICK UP OBJECT: MODERATE
LYING IN BED (TURNING OVER, MAINTAINING HIP POSITION): MILD
IMPORTED FORM: YES

## 2024-01-22 NOTE — H&P PST ADULT - FALL HARM RISK - FALLEN IN PAST
Spoke to pt, v/u.    Future Appointments   Date Time Provider Department Center   1/31/2024  4:30 PM Andreina Garcia,  [facility] [place]        No

## 2024-03-28 NOTE — H&P PST ADULT - RS GEN PE MLT RESP DETAILS PC
Full Procedure airway patent/breath sounds equal/good air movement/respirations non-labored/clear to auscultation bilaterally/no rales/no rhonchi/no wheezes

## 2024-05-21 ASSESSMENT — HOOS JR
HOOS JR RAW SCORE: 1
RISING FROM SITTING: MILD

## 2024-05-22 NOTE — PHYSICAL EXAM
Care Transitions Initial Follow Up Call    Call within 2 business days of discharge: Yes    -First attempt to reach the patient for initial Care Transition call post hospital discharge. HIPAA compliant message left with CTN's contact information requesting return phone call.   -Noted in EMR patient has HFU scheduled for tomorrow.  -CTN phoned [facility] and spoke to JAMES Curiel tentative for tomorrow-just waiting on orders.       Patient: Libby Hebert Patient : 1948   MRN: [id number]  Reason for Admission: chest pain  Discharge Date: 24 RARS: Readmission Risk Score: 11.3      Last Discharge Facility       Date Complaint Diagnosis Description Type Department Provider    24 Leg Swelling; Chest Pain; Hypertension Chest pain, unspecified type ... ED to Hosp-Admission (Discharged) (ADMITTED) [facility] [place]ri Vasquez MD; Jj-Spirt...                Follow Up  Future Appointments   Date Time Provider Department Center   2024  9:30 AM [facility]   2024 10:45 AM Phoenix Alexander DO [facility]   2024  9:00 AM Ramiro Peña MD Surg Weight [facility]   10/14/2024  9:00 AM Polina Hendrickson MD [facility]         Caren Da Silva RN    
[de-identified] : Constitutional\par o Appearance : well-nourished, well developed, alert, in no acute distress \par Head and Face\par o Head :\par ¦ Inspection : atraumatic, normocephalic\par o Face :\par ¦ Inspection : no visible rash or discoloration\par Respiratory\par o Respiratory Effort: breathing unlabored \par Neurologic\par o Mental Status Examination :\par ¦ Orientation : alert and oriented X 3\par Psychiatric\par o Mood and Affect: mood normal, affect appropriate\par Cardiovascular\par o Observation/Palpation : - no swelling\par Lymphatic\par o Additional Nodes : No palpable lymph nodes present\par \par Lumbosacral Spine\par o Inspection : no visible rash or discoloration\par o Palpation : paraspinal musculature nontender\par o Range of Motion : arc of motion full and painless in all planes\par o Muscle Strength : paraspinal muscle strength and tone within normal limits\par o Muscle Tone : paraspinal muscle strength and tone within normal limits\par o Tests: straight leg test negative and GHISLAINE test negative bilaterally\par  \par Right Lower Extremity\par o Buttock : no tenderness, swelling or deformities\par o Right Hip :\par ¦ Inspection/Palpation : no tenderness, swelling or deformities\par ¦ Range of Motion : full flexion, slight limitation of internal and external rotation with mild discomfort, no crepitance\par ¦ Stability : joint stability intact\par ¦ Strength : extension, flexion 5/5, adduction 5/5, abduction 5/5, internal rotation and external rotation\par \par Left Lower Extremity\par o Buttock : no tenderness, swelling or deformities \par o Left Hip :\par ¦ Inspection/Palpation : no tenderness, swelling or deformities\par ¦ Range of Motion : full flexion, slightly more limited internal rotation on the left compared to the right, slightly limited external rotation, no crepitance\par ¦ Stability : joint stability intact\par ¦ Strength : extension, flexion 5/5, adduction 5/5, abduction 5/5, internal rotation and external rotation\par  \par Gait: normal smooth gait, heel and toe walking without discomfort, no significant extremity swelling or lymphedema, right leg is 1/4 inch longer than the left, no foot drop, excellent core strength, normal sensation to light touch in both lower extremities

## 2024-09-14 NOTE — H&P PST ADULT - TOBACCO USE
Never smoker Benefits, risks, and possible complications of procedure explained to patient/caregiver who verbalized understanding and gave verbal consent.

## 2024-10-03 NOTE — DISCHARGE NOTE PROVIDER - [facility]_MEDRECSTATUS_GEN_ALL_CORE
Chief Complaint:    Chief Complaint   Patient presents with    Hematuria     ASSESSMENT  1. Hematuria, unspecified type       PLAN    Urinalysis discussed with patient.  This was unremarkable for UTI.  Unclear cause of his symptoms.    Push fluids.  Follow up with PCP in the next week for further evaluation if symptoms persist.  .  Worrisome symptoms discussed with instructions to go to the ED.  Patient verbalized understanding and agreed with this plan.    Labs:     Results for orders placed or performed in visit on 10/03/24   UA Macroscopic with reflex to Microscopic and Culture - Lab Collect     Status: Abnormal    Specimen: Urine, Clean Catch   Result Value Ref Range    Color Urine Yellow Colorless, Straw, Light Yellow, Yellow    Appearance Urine Slightly Cloudy (A) Clear    Glucose Urine Negative Negative mg/dL    Bilirubin Urine Negative Negative    Ketones Urine 15 (A) Negative mg/dL    Specific Gravity Urine 1.025 1.003 - 1.035    Blood Urine Large (A) Negative    pH Urine 6.5 5.0 - 7.0    Protein Albumin Urine 100 (A) Negative mg/dL    Urobilinogen Urine 2.0 (A) 0.2, 1.0 E.U./dL    Nitrite Urine Negative Negative    Leukocyte Esterase Urine Negative Negative       Problem history    There is no problem list on file for this patient.      Current Meds  No current outpatient medications on file.    Allergies  No Known Allergies    SUBJECTIVE    HPI:  Cordell Barnett is a 52 year old male who has symptoms of hematuria that he noticed tonight.  he denies back pain, nausea, vomiting, fever, and chills, flank pain.  No testicular pain, frequency, urgency, or dysuria.  He is not concerned about STDs.       ROS:      Review of Systems   Constitutional: Negative.  Negative for chills and fever.   HENT: Negative.  Negative for sore throat.    Respiratory: Negative.  Negative for cough, chest tightness, shortness of breath and wheezing.    Cardiovascular: Negative.  Negative for chest pain and palpitations.    Gastrointestinal: Negative.  Negative for abdominal pain, diarrhea, nausea and vomiting.   Genitourinary:  Positive for hematuria. Negative for dysuria, frequency and urgency.   Musculoskeletal: Negative.  Negative for myalgias.   Skin:  Negative for rash.   Allergic/Immunologic: Negative.  Negative for immunocompromised state.   Neurological: Negative.  Negative for headaches.       Family History   No family history on file.     Social History  Social History     Socioeconomic History    Marital status: Single     Spouse name: Not on file    Number of children: Not on file    Years of education: Not on file    Highest education level: Not on file   Occupational History    Not on file   Tobacco Use    Smoking status: Former     Types: Cigarettes     Passive exposure: Current    Smokeless tobacco: Never   Vaping Use    Vaping status: Never Used   Substance and Sexual Activity    Alcohol use: Not on file    Drug use: Not on file    Sexual activity: Not on file   Other Topics Concern    Not on file   Social History Narrative    Not on file     Social Determinants of Health     Financial Resource Strain: Not on File (2020)    Received from [facility]    Financial Resource Strain     Financial Resource Strain: 0   Food Insecurity: Not on File (2020)    Received from [facility]    Food Insecurity     Food: 0   Transportation Needs: Not on File (2020)    Received from [facility]    Transportation Needs     Transportation: 0   Physical Activity: Not on File (2020)    Received from [facility]    Physical Activity     Physical Activity: 0   Stress: Not on File (2020)    Received from [facility]    Stress     Stress: 0   Social Connections: Not on File (2020)    Received from [facility]    Social Connections     Social Connections and Isolation: 0   Interpersonal Safety: Not on file   Housing Stability: Not on File (2020)    Received from [facility]    Housing Stability     Housin           OBJECTIVE     Vital  signs noted and reviewed by Tutu Guevara PA-C  /85   Pulse 81   Temp 98  F (36.7  C) (Tympanic)   Resp 16   Wt 97.1 kg (214 lb)   SpO2 95%      Physical Exam  Vitals and nursing note reviewed.   Constitutional:       General: He is not in acute distress.     Appearance: He is well-developed. He is not ill-appearing, toxic-appearing or diaphoretic.   HENT:      Head: Normocephalic and atraumatic.      Right Ear: Hearing, tympanic membrane, ear canal and external ear normal. Tympanic membrane is not perforated, erythematous, retracted or bulging.      Left Ear: Hearing, tympanic membrane, ear canal and external ear normal. Tympanic membrane is not perforated, erythematous, retracted or bulging.      Nose: Nose normal. No mucosal edema, congestion or rhinorrhea.      Mouth/Throat:      Pharynx: No oropharyngeal exudate or posterior oropharyngeal erythema.      Tonsils: No tonsillar exudate or tonsillar abscesses. 0 on the right. 0 on the left.   Eyes:      Pupils: Pupils are equal, round, and reactive to light.   Cardiovascular:      Rate and Rhythm: Normal rate and regular rhythm.      Heart sounds: Normal heart sounds, S1 normal and S2 normal. Heart sounds not distant. No murmur heard.     No friction rub. No gallop.   Pulmonary:      Effort: Pulmonary effort is normal. No respiratory distress.      Breath sounds: Normal breath sounds. No decreased breath sounds, wheezing, rhonchi or rales.   Abdominal:      General: Bowel sounds are normal. There is no distension.      Palpations: Abdomen is soft.      Tenderness: There is no abdominal tenderness.   Musculoskeletal:      Cervical back: Normal range of motion and neck supple.   Lymphadenopathy:      Cervical: No cervical adenopathy.   Skin:     General: Skin is warm and dry.      Findings: No rash.   Neurological:      Mental Status: He is alert.      Cranial Nerves: No cranial nerve deficit.   Psychiatric:         Attention and Perception: He is  attentive.         Speech: Speech normal.         Behavior: Behavior normal. Behavior is cooperative.         Thought Content: Thought content normal.         Judgment: Judgment normal.             Tutu Guevara PA-C  10/3/2024, 6:22 PM     Admission Reconciliation is Completed  Discharge Reconciliation is Not Complete Admission Reconciliation is Completed  Discharge Reconciliation is Completed

## 2025-04-25 NOTE — PHYSICAL THERAPY INITIAL EVALUATION ADULT - ADDITIONAL COMMENTS
Chronic, at goal (stable), continue current treatment plan    Orders:    Albumin/Creatinine Ratio, Urine    POCT Urinalysis no Micro     Pt lives in house with 3  steps to enter no HR. 12 steps inside with handrail. Owns DME-RW, commode,cane. Pt's spouse will assist at home upon d/c. Has tub.

## (undated) DEVICE — DRSG SILVERLON ISLAND 4X6" 2X4" PAD

## (undated) DEVICE — ELCTR PENCIL NEPTUNE SMOKE EVACUATION

## (undated) DEVICE — SUT TEVDEK 1 30" KC-6

## (undated) DEVICE — SOLIDIFIER CANN EXPRESS 3K

## (undated) DEVICE — HOOD FLYTE STRYKER HELMET SHIELD

## (undated) DEVICE — SOL IRR POUR H2O 1500ML

## (undated) DEVICE — SYM-STRYKER SYSTEM 7: Type: DURABLE MEDICAL EQUIPMENT

## (undated) DEVICE — DRAPE PATIENT ISOLATION

## (undated) DEVICE — SYR LUER LOK 20CC

## (undated) DEVICE — SUT POLYSORB 0 60" REEL

## (undated) DEVICE — SUT POLYSORB 1 27" GS-12 UNDYED

## (undated) DEVICE — IRRISEPT JET LAVAGE W 0.05 PCT CHG

## (undated) DEVICE — SOL IRR BAG NS 0.9% 3000ML

## (undated) DEVICE — DRSG SILVERLON ISLAND 4X10" 2X8" PAD

## (undated) DEVICE — WRAP COMPRESSION CALF MED

## (undated) DEVICE — DRAPE 3/4 SHEET 52X76"

## (undated) DEVICE — SOL IRR POUR NS 0.9% 500ML

## (undated) DEVICE — BAG SPONGE COUNTER EZ

## (undated) DEVICE — SUT MONOSOF 3-0 30" C-16

## (undated) DEVICE — BLANKET WARMER UPPER ADULT

## (undated) DEVICE — POSITIONER POSITIONING ROLL  5X17"

## (undated) DEVICE — SPONGE RAYTEC 4X4 16PLY

## (undated) DEVICE — DRAPE TOP SHEET 53"X101"

## (undated) DEVICE — Device

## (undated) DEVICE — SAW BLADE STRYKER SAGITTAL AGGRESSIVE 25X86.5X1.32MM

## (undated) DEVICE — GOWN TRIMAX XXL

## (undated) DEVICE — CONTAINER SPECIMEN PET

## (undated) DEVICE — POSITIONER STIRRUP STRAP W SLIP RING 19X3.5"

## (undated) DEVICE — PACK TOTAL HIP CUST

## (undated) DEVICE — DRAPE C ARM UNIVERSAL

## (undated) DEVICE — POSITIONER STRAP ARMBOARD VELCRO TS-30 12

## (undated) DEVICE — ELCTR EDGE BOVIE COATED BLADE TIP 6.5"

## (undated) DEVICE — DRILL BIT EXACTECH MODULAR 3.2MM

## (undated) DEVICE — POSITIONER HANA PATIENT CARE KIT POSITION SUPINE

## (undated) DEVICE — SEALER BIPOLAR 6.0 AQUAMANTYS

## (undated) DEVICE — SUT POLYSORB 2-0 30" GS-11 UNDYED

## (undated) DEVICE — DRAPE MAYO STAND 23"

## (undated) DEVICE — DRAPE IOBAN 23X23"

## (undated) DEVICE — GLV 8 PROTEXIS

## (undated) DEVICE — DRAIN JACKSON PRATT 3 SPRING RESERVOIR W 10FR PVC DRAIN

## (undated) DEVICE — NDL HYPO SAFE 22G X 1.5"